# Patient Record
Sex: FEMALE | Race: WHITE | NOT HISPANIC OR LATINO | Employment: OTHER | URBAN - METROPOLITAN AREA
[De-identification: names, ages, dates, MRNs, and addresses within clinical notes are randomized per-mention and may not be internally consistent; named-entity substitution may affect disease eponyms.]

---

## 2017-10-24 ENCOUNTER — HOSPITAL ENCOUNTER (OUTPATIENT)
Dept: FAMILY MEDICINE CLINIC | Facility: CLINIC | Age: 22
Setting detail: SPECIMEN
Discharge: HOME | End: 2017-10-24
Attending: FAMILY MEDICINE | Admitting: FAMILY MEDICINE

## 2018-06-20 ENCOUNTER — HOSPITAL ENCOUNTER (OUTPATIENT)
Dept: LAB | Facility: HOSPITAL | Age: 23
Discharge: HOME | End: 2018-06-20
Attending: INTERNAL MEDICINE | Admitting: INTERNAL MEDICINE

## 2018-06-20 LAB
BASOPHILS # BLD AUTO: 0.1 10*3/UL (ref 0–0.2)
BASOPHILS NFR BLD AUTO: 1 % (ref 0–2)
DIFFERENTIAL METHOD BLD: (no result)
EOSINOPHIL # BLD AUTO: 0.1 10*3/UL (ref 0–0.3)
EOSINOPHIL # BLD AUTO: 1 % (ref 0–3)
ERYTHROCYTE [DISTWIDTH] IN BLOOD BY AUTOMATED COUNT: 13.8 % (ref 11.5–14.5)
HCT VFR BLD AUTO: 37 % (ref 35–49)
HGB BLD-MCNC: (no result) G/DL (ref 12–15)
LYMPHOCYTES # BLD AUTO: 2.1 10*3/UL (ref 0.8–4.8)
LYMPHOCYTES NFR BLD AUTO: 22 % (ref 18–42)
MCH RBC QN AUTO: 29.5 PG (ref 26–32)
MCHC RBC AUTO-ENTMCNC: 33.3 G/DL (ref 32–36)
MCV RBC AUTO: 88.5 FL (ref 80–94)
MONOCYTES # BLD AUTO: 0.4 10*3/UL (ref 0.1–1.3)
MONOCYTES NFR BLD AUTO: 4 % (ref 2–11)
NEUTROPHILS # BLD AUTO: 6.7 10*3/UL (ref 2.3–8.6)
NEUTROPHILS NFR BLD AUTO: 72 % (ref 50–75)
NRBC BLD AUTO-RTO: 0 /100{WBCS}
NRBC/RBC NFR BLD MANUAL: 0 10*3/UL
PLATELET # BLD AUTO: (no result) 10*3/UL (ref 150–450)
PMV BLD AUTO: 8.8 FL (ref 7.4–10.4)
RBC # BLD AUTO: 4.18 10*6/UL (ref 4–5.4)
WBC # BLD AUTO: 9.2 10*3/UL (ref 4.5–11.5)

## 2019-07-09 ENCOUNTER — TELEPHONE (OUTPATIENT)
Dept: FAMILY MEDICINE CLINIC | Facility: CLINIC | Age: 24
End: 2019-07-09

## 2019-07-09 RX ORDER — DEXTROAMPHETAMINE SACCHARATE, AMPHETAMINE ASPARTATE MONOHYDRATE, DEXTROAMPHETAMINE SULFATE AND AMPHETAMINE SULFATE 7.5; 7.5; 7.5; 7.5 MG/1; MG/1; MG/1; MG/1
CAPSULE, EXTENDED RELEASE ORAL
COMMUNITY
Start: 2018-07-24 | End: 2019-07-09 | Stop reason: SDUPTHER

## 2019-07-09 RX ORDER — DEXTROAMPHETAMINE SACCHARATE, AMPHETAMINE ASPARTATE MONOHYDRATE, DEXTROAMPHETAMINE SULFATE AND AMPHETAMINE SULFATE 7.5; 7.5; 7.5; 7.5 MG/1; MG/1; MG/1; MG/1
30 CAPSULE, EXTENDED RELEASE ORAL EVERY MORNING
Qty: 30 CAPSULE | Refills: 0 | Status: SHIPPED | OUTPATIENT
Start: 2019-07-09 | End: 2019-08-05 | Stop reason: SDUPTHER

## 2019-08-05 RX ORDER — DEXTROAMPHETAMINE SACCHARATE, AMPHETAMINE ASPARTATE MONOHYDRATE, DEXTROAMPHETAMINE SULFATE AND AMPHETAMINE SULFATE 7.5; 7.5; 7.5; 7.5 MG/1; MG/1; MG/1; MG/1
30 CAPSULE, EXTENDED RELEASE ORAL EVERY MORNING
Qty: 30 CAPSULE | Refills: 0 | Status: SHIPPED | OUTPATIENT
Start: 2019-08-05 | End: 2019-08-29 | Stop reason: SDUPTHER

## 2019-08-18 ENCOUNTER — TELEPHONE (OUTPATIENT)
Dept: FAMILY MEDICINE CLINIC | Facility: CLINIC | Age: 24
End: 2019-08-18

## 2019-08-18 ENCOUNTER — DOCUMENTATION (OUTPATIENT)
Dept: FAMILY MEDICINE CLINIC | Facility: CLINIC | Age: 24
End: 2019-08-18

## 2019-08-18 RX ORDER — PROMETHAZINE HYDROCHLORIDE 25 MG/1
25 TABLET ORAL EVERY 8 HOURS PRN
Qty: 20 TABLET | Refills: 0 | Status: SHIPPED | OUTPATIENT
Start: 2019-08-18 | End: 2020-02-14

## 2019-08-18 NOTE — TELEPHONE ENCOUNTER
Patient's mother called today reporting that shall be broke her right arm 2 days ago.  She was seen at urgent care in [place].  She was not given any brace or even a sling.  She was given prescription for hydrocodone 5 mg.  She reports that her pain is not well controlled and she has hard time to cope with all of that.  She is on her way home to know all the need to be with mom so she can help with her 1-year-old baby.  Mother is requesting something to relax her like Ativan.  I advised patient to take Aleve twice a day plus the pain medicine which was given to her by urgent care. I did not give her prescription for Ativan.  She was advised to go to urgent care again today if needs to be evaluated today.  Otherwise she needs to call her also office tomorrow to be seen.  She was also advised to call her primary care if needs to be seen.  Mother requested prescription for something for nausea and I send in prescription for Phenergan.  All questions were answered to her satisfaction.

## 2019-08-20 ENCOUNTER — OFFICE VISIT (OUTPATIENT)
Dept: FAMILY MEDICINE CLINIC | Facility: CLINIC | Age: 24
End: 2019-08-20

## 2019-08-20 VITALS
WEIGHT: 179 LBS | HEART RATE: 128 BPM | DIASTOLIC BLOOD PRESSURE: 94 MMHG | SYSTOLIC BLOOD PRESSURE: 141 MMHG | BODY MASS INDEX: 28.09 KG/M2 | OXYGEN SATURATION: 98 % | TEMPERATURE: 97.7 F | HEIGHT: 67 IN

## 2019-08-20 DIAGNOSIS — S52.501D CLOSED FRACTURE OF DISTAL END OF RIGHT RADIUS WITH ROUTINE HEALING, UNSPECIFIED FRACTURE MORPHOLOGY, SUBSEQUENT ENCOUNTER: ICD-10-CM

## 2019-08-20 DIAGNOSIS — F41.9 ANXIETY: Primary | ICD-10-CM

## 2019-08-20 PROBLEM — J45.909 ASTHMA: Status: ACTIVE | Noted: 2019-08-20

## 2019-08-20 PROCEDURE — 99213 OFFICE O/P EST LOW 20 MIN: CPT | Performed by: FAMILY MEDICINE

## 2019-08-20 RX ORDER — DULOXETIN HYDROCHLORIDE 30 MG/1
30 CAPSULE, DELAYED RELEASE ORAL DAILY
Qty: 30 CAPSULE | Refills: 1 | Status: SHIPPED | OUTPATIENT
Start: 2019-08-20 | End: 2019-09-17

## 2019-08-20 RX ORDER — HYDROCODONE BITARTRATE AND ACETAMINOPHEN 5; 325 MG/1; MG/1
TABLET ORAL
Refills: 0 | COMMUNITY
Start: 2019-08-17 | End: 2020-02-14

## 2019-08-20 RX ORDER — CLINDAMYCIN PHOSPHATE 11.9 MG/ML
SOLUTION TOPICAL 2 TIMES DAILY
Qty: 60 ML | Refills: 5 | Status: SHIPPED | OUTPATIENT
Start: 2019-08-20 | End: 2021-02-01 | Stop reason: SDUPTHER

## 2019-08-20 NOTE — PROGRESS NOTES
Subjective   Chief Complaint   Patient presents with   • Anxiety     Arlette Mix is a 23 y.o. female.     Last week on 8/16/19 she fell while catching her daughter from falling off of a bed.  She caught the baby with her left hand and landed on her outstretched right hand.  She went to a local urgent care and was diagnosed with a fracture of radial head.  She followed up with [facility] yesterday.  No cast or splint was recommended.  Sling was given for a few days but she was instructed to keep moving. Follow up in 2 weeks.  Will start PT in a few weeks.  Increased stress since the injury.  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      Anxiety   Presents for initial visit. Onset was 6 to 12 months ago. The problem has been rapidly worsening. Symptoms include decreased concentration, depressed mood, excessive worry, insomnia and nervous/anxious behavior. Patient reports no chest pain, dizziness, palpitations, shortness of breath or suicidal ideas. Symptoms occur constantly. The severity of symptoms is causing significant distress. The symptoms are aggravated by family issues.     Risk factors include recent illness. Her past medical history is significant for depression.      Past Medical History:   Diagnosis Date   • Right radial head fracture 08/16/2019     No past surgical history on file.  No Known Allergies  Social History     Socioeconomic History   • Marital status: Single     Spouse name: Not on file   • Number of children: Not on file   • Years of education: Not on file   • Highest education level: Not on file   Tobacco Use   • Smoking status: Never Smoker   • Smokeless tobacco: Never Used   Substance and Sexual Activity   • Alcohol use: Yes     Comment: caffeine 1c qd     Social History     Tobacco Use   Smoking Status Never Smoker   Smokeless Tobacco Never Used       family history is not on file.  Current Outpatient Medications on File Prior to Visit   Medication Sig  "Dispense Refill   • HYDROcodone-acetaminophen (NORCO) 5-325 MG per tablet   0   • amphetamine-dextroamphetamine XR (ADDERALL XR) 30 MG 24 hr capsule Take 1 capsule by mouth Every Morning 30 capsule 0   • promethazine (PHENERGAN) 25 MG tablet Take 1 tablet by mouth Every 8 (Eight) Hours As Needed for Nausea or Vomiting. 20 tablet 0     No current facility-administered medications on file prior to visit.      There is no problem list on file for this patient.      The following portions of the patient's history were reviewed and updated as appropriate: allergies, current medications, past family history, past medical history, past social history, past surgical history and problem list.    Review of Systems   Constitutional: Negative for chills and fever.   HENT: Negative for sinus pressure and sore throat.    Eyes: Negative for blurred vision.   Respiratory: Negative for cough and shortness of breath.    Cardiovascular: Negative for chest pain and palpitations.   Gastrointestinal: Negative for abdominal pain.   Endocrine: Negative for polyuria.   Musculoskeletal: Positive for myalgias.   Skin: Negative for rash.   Neurological: Negative for dizziness and headache.   Hematological: Negative for adenopathy.   Psychiatric/Behavioral: Positive for decreased concentration and depressed mood. Negative for suicidal ideas. The patient is nervous/anxious and has insomnia.        Objective   /94 (BP Location: Right arm, Patient Position: Sitting, Cuff Size: Adult)   Pulse (!) 128   Temp 97.7 °F (36.5 °C) (Oral)   Ht 168.9 cm (66.5\")   Wt 81.2 kg (179 lb)   SpO2 98%   BMI 28.46 kg/m²   Physical Exam   Constitutional: She is oriented to person, place, and time. She appears well-developed. No distress.   HENT:   Head: Normocephalic.   Eyes: Conjunctivae and lids are normal.   Neck: Trachea normal. No thyroid mass and no thyromegaly present.   Cardiovascular: Normal rate, regular rhythm and normal heart sounds. "   Pulmonary/Chest: Effort normal and breath sounds normal.   Musculoskeletal:   Right wrist mildly swollen   Lymphadenopathy:     She has no cervical adenopathy.   Neurological: She is alert and oriented to person, place, and time.   Skin: Skin is warm and dry.   Psychiatric: Her speech is normal and behavior is normal. Her mood appears anxious. She is attentive.       No visits with results within 1 Week(s) from this visit.   Latest known visit with results is:   Hospital Outpatient Visit on 06/20/2018   Component Date Value Ref Range Status   • WBC 06/20/2018 9.2  4.5 - 11.5 10*3/uL Final   • RBC 06/20/2018 4.18  4.00 - 5.40 10*6/uL Final   • Hemoglobin 06/20/2018 12.3 RESULT CHECKED  12.0 - 15.0 g/dL Final   • Hematocrit 06/20/2018 37.0  35 - 49 % Final   • MCV 06/20/2018 88.5  80 - 94 fL Final   • MCH 06/20/2018 29.5  26 - 32 pg Final   • MCHC 06/20/2018 33.3  32 - 36 g/dL Final   • RDW 06/20/2018 13.8  11.5 - 14.5 % Final   • Platelets 06/20/2018 368 RESULT CHECKED  150 - 450 10*3/uL Final   • MPV 06/20/2018 8.8  7.4 - 10.4 fL Final   • Differential Type 06/20/2018 AUTO   Final   • Neutrophils Absolute 06/20/2018 6.7  2.3 - 8.6 10*3/uL Final   • Lymphocytes Absolute 06/20/2018 2.1  0.8 - 4.8 10*3/uL Final   • Monocytes Absolute 06/20/2018 0.4  0.1 - 1.3 10*3/uL Final   • Eosinophils Absolute 06/20/2018 0.1  0.0 - 0.3 10*3/uL Final   • Basophils Absolute 06/20/2018 0.1  0 - 0.2 10*3/uL Final   • Neutrophil Rel % 06/20/2018 72  50 - 75 % Final   • Lymphocyte Rel % 06/20/2018 22  18 - 42 % Final   • Monocyte Rel % 06/20/2018 4  2 - 11 % Final   • Eosinophil Rel % 06/20/2018 1  0 - 3 % Final   • Basophil Rel % 06/20/2018 1  0 - 2 % Final   • nRBC 06/20/2018 0  0 /100[WBCs] Final   • Absolute nRBC 06/20/2018 0  10*3/uL Final           Assessment/Plan   Problems Addressed this Visit        Musculoskeletal and Integument    Closed fracture of lower end of right radius with routine healing     Follow up with [facility]  [facility] as planned            Other    Anxiety - Primary          There are no diagnoses linked to this encounter.

## 2019-08-29 RX ORDER — DEXTROAMPHETAMINE SACCHARATE, AMPHETAMINE ASPARTATE MONOHYDRATE, DEXTROAMPHETAMINE SULFATE AND AMPHETAMINE SULFATE 7.5; 7.5; 7.5; 7.5 MG/1; MG/1; MG/1; MG/1
30 CAPSULE, EXTENDED RELEASE ORAL EVERY MORNING
Qty: 30 CAPSULE | Refills: 0 | Status: SHIPPED | OUTPATIENT
Start: 2019-08-29 | End: 2019-10-01 | Stop reason: SDUPTHER

## 2019-09-16 ENCOUNTER — TELEPHONE (OUTPATIENT)
Dept: FAMILY MEDICINE CLINIC | Facility: CLINIC | Age: 24
End: 2019-09-16

## 2019-09-17 RX ORDER — CITALOPRAM 20 MG/1
20 TABLET ORAL DAILY
Qty: 90 TABLET | Refills: 1 | Status: SHIPPED | OUTPATIENT
Start: 2019-09-17 | End: 2020-04-13 | Stop reason: SDUPTHER

## 2019-10-01 RX ORDER — DEXTROAMPHETAMINE SACCHARATE, AMPHETAMINE ASPARTATE MONOHYDRATE, DEXTROAMPHETAMINE SULFATE AND AMPHETAMINE SULFATE 7.5; 7.5; 7.5; 7.5 MG/1; MG/1; MG/1; MG/1
30 CAPSULE, EXTENDED RELEASE ORAL EVERY MORNING
Qty: 30 CAPSULE | Refills: 0 | Status: SHIPPED | OUTPATIENT
Start: 2019-10-01 | End: 2019-10-28 | Stop reason: SDUPTHER

## 2019-10-28 RX ORDER — DEXTROAMPHETAMINE SACCHARATE, AMPHETAMINE ASPARTATE MONOHYDRATE, DEXTROAMPHETAMINE SULFATE AND AMPHETAMINE SULFATE 7.5; 7.5; 7.5; 7.5 MG/1; MG/1; MG/1; MG/1
30 CAPSULE, EXTENDED RELEASE ORAL EVERY MORNING
Qty: 30 CAPSULE | Refills: 0 | Status: SHIPPED | OUTPATIENT
Start: 2019-10-28 | End: 2019-11-20 | Stop reason: SDUPTHER

## 2019-11-20 RX ORDER — DEXTROAMPHETAMINE SACCHARATE, AMPHETAMINE ASPARTATE MONOHYDRATE, DEXTROAMPHETAMINE SULFATE AND AMPHETAMINE SULFATE 7.5; 7.5; 7.5; 7.5 MG/1; MG/1; MG/1; MG/1
30 CAPSULE, EXTENDED RELEASE ORAL EVERY MORNING
Qty: 30 CAPSULE | Refills: 0 | Status: SHIPPED | OUTPATIENT
Start: 2019-11-20 | End: 2019-12-18 | Stop reason: SDUPTHER

## 2019-12-18 ENCOUNTER — TELEPHONE (OUTPATIENT)
Dept: FAMILY MEDICINE CLINIC | Facility: CLINIC | Age: 24
End: 2019-12-18

## 2019-12-18 DIAGNOSIS — F90.0 ATTENTION DEFICIT HYPERACTIVITY DISORDER (ADHD), PREDOMINANTLY INATTENTIVE TYPE: Primary | ICD-10-CM

## 2019-12-18 RX ORDER — DEXTROAMPHETAMINE SACCHARATE, AMPHETAMINE ASPARTATE MONOHYDRATE, DEXTROAMPHETAMINE SULFATE AND AMPHETAMINE SULFATE 7.5; 7.5; 7.5; 7.5 MG/1; MG/1; MG/1; MG/1
30 CAPSULE, EXTENDED RELEASE ORAL EVERY MORNING
Qty: 30 CAPSULE | Refills: 0 | Status: SHIPPED | OUTPATIENT
Start: 2019-12-18 | End: 2020-01-21 | Stop reason: SDUPTHER

## 2019-12-18 NOTE — TELEPHONE ENCOUNTER
REFILL OF ADDERALL.  PHARMACY IS [facility]. CORNER OF [facility] [address]

## 2020-01-20 ENCOUNTER — TELEPHONE (OUTPATIENT)
Dept: FAMILY MEDICINE CLINIC | Facility: CLINIC | Age: 25
End: 2020-01-20

## 2020-01-20 DIAGNOSIS — F90.0 ATTENTION DEFICIT HYPERACTIVITY DISORDER (ADHD), PREDOMINANTLY INATTENTIVE TYPE: ICD-10-CM

## 2020-01-20 NOTE — TELEPHONE ENCOUNTER
ADDERALL REFILL. [facility] AND [facility]

## 2020-01-21 RX ORDER — DEXTROAMPHETAMINE SACCHARATE, AMPHETAMINE ASPARTATE MONOHYDRATE, DEXTROAMPHETAMINE SULFATE AND AMPHETAMINE SULFATE 7.5; 7.5; 7.5; 7.5 MG/1; MG/1; MG/1; MG/1
30 CAPSULE, EXTENDED RELEASE ORAL EVERY MORNING
Qty: 30 CAPSULE | Refills: 0 | Status: SHIPPED | OUTPATIENT
Start: 2020-01-21 | End: 2020-02-18 | Stop reason: SDUPTHER

## 2020-02-14 ENCOUNTER — OFFICE VISIT (OUTPATIENT)
Dept: FAMILY MEDICINE CLINIC | Facility: CLINIC | Age: 25
End: 2020-02-14

## 2020-02-14 VITALS
OXYGEN SATURATION: 98 % | SYSTOLIC BLOOD PRESSURE: 151 MMHG | HEART RATE: 95 BPM | WEIGHT: 184 LBS | DIASTOLIC BLOOD PRESSURE: 94 MMHG | BODY MASS INDEX: 29.25 KG/M2 | TEMPERATURE: 97.2 F

## 2020-02-14 DIAGNOSIS — L72.3 SEBACEOUS CYST: Primary | ICD-10-CM

## 2020-02-14 DIAGNOSIS — L74.510 AXILLARY HYPERHIDROSIS: ICD-10-CM

## 2020-02-14 DIAGNOSIS — L85.8 KERATOSIS PILARIS: ICD-10-CM

## 2020-02-14 PROCEDURE — 99214 OFFICE O/P EST MOD 30 MIN: CPT | Performed by: FAMILY MEDICINE

## 2020-02-14 RX ORDER — TOPIRAMATE 25 MG/1
25 TABLET ORAL DAILY
Qty: 90 TABLET | Refills: 1 | Status: SHIPPED | OUTPATIENT
Start: 2020-02-14 | End: 2021-02-12

## 2020-02-14 NOTE — ASSESSMENT & PLAN NOTE
Very small.  No need for treatment at this time.  Pt advised to call back if spot enlarges or if any sign of infection.

## 2020-02-14 NOTE — PATIENT INSTRUCTIONS
Hyperhidrosis  Hyperhidrosis is a condition in which the body sweats a lot more than normal (excessively). Sweating is a necessary function for a human body. It is normal to sweat when you are hot, physically active, or anxious. However, hyperhidrosis is sweating to an excessive degree. Although the condition is not a serious one, it can make you feel embarrassed.  There are two kinds of hyperhidrosis:  · Primary hyperhidrosis. The sweating usually localizes in one part of your body, such as your underarms, or in a few areas, such as your feet, face, underarms, and hands. This is the more common kind of hyperhidrosis.  · Secondary hyperhidrosis. This type usually affects your entire body.  What are the causes?  The cause of this condition depends on the kind of hyperhidrosis that you have.  · Primary hyperhidrosis may be caused by sweat glands that are more active than normal.  · Secondary hyperhidrosis may be caused by an underlying condition or by taking certain medicines, such as antidepressants or diabetes medicines. Possible conditions that may cause secondary hyperhidrosis include:  ? Diabetes.  ? Gout.  ? Anxiety.  ? Obesity.  ? Menopause.  ? Overactive thyroid (hyperthyroidism).  ? Tumors.  ? Frostbite.  ? Certain types of cancers.  ? Alcoholism.  ? Injury to your nervous system.  ? Stroke.  ? Parkinson's disease.  What increases the risk?  You are more likely to develop primary hyperhidrosis if you have a family history of the condition.  What are the signs or symptoms?  Symptoms of this condition include:  · Feeling like you are sweating constantly, even while you are not being active.  · Having skin that peels or gets paler or softer in the areas where you sweat the most.  · Being able to see sweat on your skin.  Other symptoms depend on the kind of hyperhidrosis that you have.  · Symptoms of primary hyperhidrosis may include:  ? Sweating in the same location on both sides of your body.  ? Sweating only  during the day and not while you are sleeping.  ? Sweating in specific areas, such as your underarms, palms, feet, and face.  · Symptoms of secondary hyperhidrosis may include:  ? Sweating all over your body.  ? Sweating even while you sleep.  How is this diagnosed?  This condition may be diagnosed by:  · Medical history.  · Physical exam.  You may also have other tests, including:  · Tests to measure the amount of sweat you produce and to show the areas where you sweat the most. These tests may involve:  ? Using color-changing chemicals to show patterns of sweating on the skin.  ? Weighing paper that has been applied to the skin. This will show the amount of sweat that your body produces.  ? Measuring the amount of water that evaporates from the skin.  ? Using infrared technology to show patterns of sweating on the skin.  · Tests to check for other conditions that may be causing excess sweating. This may include blood, urine, or imaging tests.  How is this treated?  Treatment for this condition depends on the kind of hyperhidrosis that you have and the areas of your body that are affected. Your health care provider will also treat any underlying conditions.  Treatment may include:  · Medicines, such as:   ? Antiperspirants. These are medicines that stop sweat.  ? Injectable medicines. These may include small injections of botulinum toxin.  ? Oral medicines. These are taken by mouth to treat underlying conditions and other symptoms.  · A procedure to:  ? Temporarily turn off the sweat glands in your hands and feet (iontophoresis).  ? Remove your sweat glands.  ? Cut or destroy the nerves so that they do not send a signal to the sweat glands (sympathectomy).  Follow these instructions at home:  Lifestyle    · Limit or avoid foods or beverages that may increase your risk of sweating, such as:  ? Spicy food.  ? Caffeine.  ? Alcohol.  ? Foods that contain monosodium glutamate (MSG).  · If your feet sweat:  ? Wear  sandals when possible.  ? Do not wear cotton socks. Wear socks that remove or wick moisture from your feet.  ? Wear leather shoes.  ? Avoid wearing the same pair of shoes for two days in a row.  · Try placing sweat pads under your clothes to prevent underarm sweat from showing.  · Keep a journal of your sweat symptoms and when they occur. This may help you identify things that trigger your sweating.  General instructions  · Take over-the-counter and prescription medicines only as told by your health care provider.  · Use antiperspirants as told by your health care provider.  · Consider joining a hyperhidrosis support group.  · Keep all follow-up visits as told by your health care provider. This is important.  Contact a health care provider if:  · You have new symptoms.  · Your symptoms get worse.  Summary  · Hyperhidrosis is a condition in which the body sweats a lot more than normal (excessively).  · With primary hyperhidrosis, the sweating usually localizes in one part of your body, such as your underarms, or in a few areas, such as your feet, face, underarms, and hands. It is caused by overactive sweat glands in the affected area.  · With secondary hyperhidrosis, the sweating affects your entire body. This is caused by an underlying condition.  · Treatment for this condition depends on the kind of hyperhidrosis that you have and the parts of your body that are affected.  This information is not intended to replace advice given to you by your health care provider. Make sure you discuss any questions you have with your health care provider.  Document Released: 02/16/2007 Document Revised: 12/21/2018 Document Reviewed: 12/21/2018  [employer] Interactive Patient Education © 2019 [employer]

## 2020-02-14 NOTE — PROGRESS NOTES
Subjective   Chief Complaint   Patient presents with   • Leg Problem     lump on back of rt leg     Arlette Mix is a 24 y.o. female.     She reports a small bump on the back of her left upper leg.  No redness or blistering or drainage just a subcutaneous bump about 5 mm.    She also has a history of hyperhidrosis that she uses topical clinamycin for.  She says this helps some but I am not sure why.  She is interested in Botox for sweat reduction but will have to check with her health insurance to see if they will cover it.  I also mentioned Qbrexza for treatment but she is not interested at this time.      She also complains of rash on her thighs and upper arms that is characterized by multiple small firm bumps without redness or drainage.     Rash   This is a new problem. The current episode started 1 to 4 weeks ago. The problem is unchanged. The rash is diffuse. The rash is characterized by dryness and itchiness. She was exposed to nothing. Pertinent negatives include no cough, fatigue, fever, rhinorrhea, shortness of breath or sore throat.      Past Medical History:   Diagnosis Date   • Right radial head fracture 08/16/2019     No past surgical history on file.  No Known Allergies  Social History     Socioeconomic History   • Marital status: Single     Spouse name: Not on file   • Number of children: Not on file   • Years of education: Not on file   • Highest education level: Not on file   Tobacco Use   • Smoking status: Never Smoker   • Smokeless tobacco: Never Used   Substance and Sexual Activity   • Alcohol use: Yes     Comment: caffeine 1c qd     Social History     Tobacco Use   Smoking Status Never Smoker   Smokeless Tobacco Never Used       family history is not on file.  Current Outpatient Medications on File Prior to Visit   Medication Sig Dispense Refill   • [DISCONTINUED] norelgestromin-ethinyl estradiol (XULANE) 150-35 MCG/24HR Apply 1 patch topically to the appropriate area as directed.     •  amphetamine-dextroamphetamine XR (ADDERALL XR) 30 MG 24 hr capsule Take 1 capsule by mouth Every Morning 30 capsule 0   • citalopram (CELEXA) 20 MG tablet Take 1 tablet by mouth Daily. 90 tablet 1   • clindamycin (CLEOCIN-T) 1 % external solution Apply  topically to the appropriate area as directed 2 (Two) Times a Day. 60 mL 5   • [DISCONTINUED] HYDROcodone-acetaminophen (NORCO) 5-325 MG per tablet   0   • [DISCONTINUED] norelgestromin-ethinyl estradiol (ORTHO EVRA) 150-35 MCG/24HR Place 1 patch on the skin as directed by provider 1 (One) Time Per Week. 12 patch 3   • [DISCONTINUED] promethazine (PHENERGAN) 25 MG tablet Take 1 tablet by mouth Every 8 (Eight) Hours As Needed for Nausea or Vomiting. 20 tablet 0     No current facility-administered medications on file prior to visit.      Patient Active Problem List   Diagnosis   • Asthma   • Attention-deficit hyperactivity disorder   • Depression   • Encounter for general adult medical examination without abnormal findings   • Anxiety   • Closed fracture of lower end of right radius with routine healing   • Axillary hyperhidrosis   • Sebaceous cyst   • Keratosis pilaris       The following portions of the patient's history were reviewed and updated as appropriate: allergies, current medications, past family history, past medical history, past social history, past surgical history and problem list.    Review of Systems   Constitutional: Negative for chills, fatigue and fever.   HENT: Negative for rhinorrhea, sinus pressure and sore throat.    Eyes: Negative for blurred vision.   Respiratory: Negative for cough and shortness of breath.    Cardiovascular: Negative for chest pain and palpitations.   Gastrointestinal: Negative for abdominal pain.   Endocrine: Negative for polyuria.   Skin: Positive for dry skin and rash.   Neurological: Negative for dizziness and headache.   Hematological: Negative for adenopathy.   Psychiatric/Behavioral: Negative for depressed mood.        Objective   /94 (BP Location: Right arm, Patient Position: Sitting, Cuff Size: Adult)   Pulse 95   Temp 97.2 °F (36.2 °C) (Tympanic)   Wt 83.5 kg (184 lb)   SpO2 98%   BMI 29.25 kg/m²   Physical Exam   Constitutional: She is oriented to person, place, and time. She appears well-developed. No distress.   HENT:   Head: Normocephalic.   Eyes: Conjunctivae and lids are normal.   Neck: Trachea normal. No thyroid mass and no thyromegaly present.   Cardiovascular: Normal rate, regular rhythm and normal heart sounds.   Pulmonary/Chest: Effort normal and breath sounds normal.   Lymphadenopathy:     She has no cervical adenopathy.   Neurological: She is alert and oriented to person, place, and time.   Skin: Skin is warm and dry.   Small, <5mm, subcutaneous cystic lesion on left upper posterior thigh consistent with sebaceous cyst.  Also with firm papules on thighs and upper arms consistent with keratosis pilaris.    Psychiatric: She has a normal mood and affect. Her speech is normal and behavior is normal. She is attentive.       No visits with results within 1 Week(s) from this visit.   Latest known visit with results is:   No results found for any previous visit.           Assessment/Plan   Problems Addressed this Visit        Musculoskeletal and Integument    Axillary hyperhidrosis     Stable.  Continue current management.          Sebaceous cyst - Primary     Very small.  No need for treatment at this time.  Pt advised to call back if spot enlarges or if any sign of infection.          Keratosis pilaris     Keep areas clean and use mild abrasive such as loofah or exfoliating scrub.

## 2020-02-18 ENCOUNTER — TELEPHONE (OUTPATIENT)
Dept: FAMILY MEDICINE CLINIC | Facility: CLINIC | Age: 25
End: 2020-02-18

## 2020-02-18 DIAGNOSIS — F90.0 ATTENTION DEFICIT HYPERACTIVITY DISORDER (ADHD), PREDOMINANTLY INATTENTIVE TYPE: ICD-10-CM

## 2020-02-18 RX ORDER — DEXTROAMPHETAMINE SACCHARATE, AMPHETAMINE ASPARTATE MONOHYDRATE, DEXTROAMPHETAMINE SULFATE AND AMPHETAMINE SULFATE 7.5; 7.5; 7.5; 7.5 MG/1; MG/1; MG/1; MG/1
30 CAPSULE, EXTENDED RELEASE ORAL EVERY MORNING
Qty: 30 CAPSULE | Refills: 0 | Status: SHIPPED | OUTPATIENT
Start: 2020-02-18 | End: 2020-03-20 | Stop reason: SDUPTHER

## 2020-02-18 NOTE — TELEPHONE ENCOUNTER
----- Message from Carol Correia sent at 2/18/2020  9:27 AM EST -----  Regarding: REFILL  amphetamine-dextroamphetamine XR (ADDERALL XR) 30 MG 24 hr capsule    [id number] [place], [place] - [address] AT [address] OF [place] [address] [address] - [phone]  - [phone] (Phone)  [phone] (Fax)    THANK YOU

## 2020-02-27 ENCOUNTER — OFFICE VISIT (OUTPATIENT)
Dept: FAMILY MEDICINE CLINIC | Facility: CLINIC | Age: 25
End: 2020-02-27

## 2020-02-27 VITALS
DIASTOLIC BLOOD PRESSURE: 90 MMHG | BODY MASS INDEX: 28.72 KG/M2 | SYSTOLIC BLOOD PRESSURE: 130 MMHG | HEIGHT: 67 IN | WEIGHT: 183 LBS | TEMPERATURE: 98.8 F | OXYGEN SATURATION: 97 % | HEART RATE: 130 BPM | RESPIRATION RATE: 16 BRPM

## 2020-02-27 DIAGNOSIS — J06.9 ACUTE URI: Primary | ICD-10-CM

## 2020-02-27 LAB
EXPIRATION DATE: NORMAL
INTERNAL CONTROL: NORMAL
Lab: NORMAL
S PYO AG THROAT QL: NEGATIVE

## 2020-02-27 PROCEDURE — 99213 OFFICE O/P EST LOW 20 MIN: CPT | Performed by: FAMILY MEDICINE

## 2020-02-27 PROCEDURE — 87880 STREP A ASSAY W/OPTIC: CPT | Performed by: FAMILY MEDICINE

## 2020-02-27 NOTE — PROGRESS NOTES
Subjective   Chief Complaint   Patient presents with   • Sore Throat   • Nausea     Arlette Mix is a 24 y.o. female.     Patient Care Team:  Rosie Manuel MD as PCP - General    She is coming in today due to upper respiratory symptoms.  She reports that her symptoms started about 3 days ago with some congestion and sore throat.  She also has some cough.  She denies any fever or body aches.  She is concerned she might have a strep as she noted some white spots in the back of her throat.  She denies any vomiting or diarrhea but she had some nausea.  She is able to eat and drink just fine.  No rashes reported.       The following portions of the patient's history were reviewed and updated as appropriate: allergies, current medications, past family history, past medical history, past social history, past surgical history and problem list.  Past Medical History:   Diagnosis Date   • Anxiety    • Right radial head fracture 2019     Past Surgical History:   Procedure Laterality Date   •  SECTION     • CHOLECYSTECTOMY       The patient has a family history of  Family History   Problem Relation Age of Onset   • Migraines Mother      Social History     Socioeconomic History   • Marital status: Single     Spouse name: Not on file   • Number of children: Not on file   • Years of education: Not on file   • Highest education level: Not on file   Tobacco Use   • Smoking status: Never Smoker   • Smokeless tobacco: Never Used   Substance and Sexual Activity   • Alcohol use: Yes     Comment: caffeine 1c qd   • Drug use: Never   • Sexual activity: Defer       Review of Systems   Constitutional: Negative for chills, fatigue and fever.   HENT: Positive for congestion and sore throat. Negative for ear pain, postnasal drip, rhinorrhea and swollen glands.    Respiratory: Positive for cough. Negative for stridor.    Gastrointestinal: Positive for nausea. Negative for abdominal pain, diarrhea and vomiting.   Skin:  "Negative for rash and skin lesions.     Visit Vitals  /90 (BP Location: Left arm, Patient Position: Sitting, Cuff Size: Large Adult)   Pulse (!) 130   Temp 98.8 °F (37.1 °C) (Oral)   Resp 16   Ht 168.9 cm (66.5\")   Wt 83 kg (183 lb)   SpO2 97%   BMI 29.09 kg/m²       Current Outpatient Medications:   •  amphetamine-dextroamphetamine XR (ADDERALL XR) 30 MG 24 hr capsule, Take 1 capsule by mouth Every Morning, Disp: 30 capsule, Rfl: 0  •  citalopram (CELEXA) 20 MG tablet, Take 1 tablet by mouth Daily., Disp: 90 tablet, Rfl: 1  •  clindamycin (CLEOCIN-T) 1 % external solution, Apply  topically to the appropriate area as directed 2 (Two) Times a Day., Disp: 60 mL, Rfl: 5  •  norelgestromin-ethinyl estradiol (XULANE) 150-35 MCG/24HR, Place 1 patch on the skin as directed by provider 1 (One) Time Per Week., Disp: 9 patch, Rfl: 3  •  topiramate (TOPAMAX) 25 MG tablet, Take 1 tablet by mouth Daily., Disp: 90 tablet, Rfl: 1    Objective   Physical Exam   Constitutional: She appears well-developed and well-nourished.   HENT:   Head: Normocephalic and atraumatic.   Right Ear: External ear normal.   Left Ear: External ear normal.   Mouth/Throat: Oropharynx is clear and moist.   Some inflammation in oropharyngeal area noted.  Some postnasal drainage.   Eyes: Pupils are equal, round, and reactive to light. Conjunctivae and EOM are normal.   Neck: Normal range of motion. Neck supple.   Cardiovascular: Normal rate, regular rhythm, normal heart sounds and intact distal pulses.   Pulmonary/Chest: Effort normal and breath sounds normal.   Neurological: She is alert.   Skin: Skin is warm.   Nursing note and vitals reviewed.           Office Visit on 02/27/2020   Component Date Value Ref Range Status   • Rapid Strep A Screen 02/27/2020 Negative  Negative, VALID, INVALID, Not Performed Final   • Internal Control 02/27/2020 Passed  Passed Final   • Lot Number 02/27/2020 [id number]   Final   • Expiration Date 02/27/2020 04/30/2021   " Final                 Assessment/Plan   Problems Addressed this Visit        Respiratory    Acute URI - Primary    Relevant Orders    POC Rapid Strep A (Completed)        Strep test was done today and was negative.  Her symptoms are consistent with upper respiratory infection.  This was discussed with the patient and symptomatic treatment was recommended.  She was advised to monitor her symptoms and call us back if not getting better.             Requested Prescriptions      No prescriptions requested or ordered in this encounter

## 2020-03-20 ENCOUNTER — TELEPHONE (OUTPATIENT)
Dept: FAMILY MEDICINE CLINIC | Facility: CLINIC | Age: 25
End: 2020-03-20

## 2020-03-20 DIAGNOSIS — F90.0 ATTENTION DEFICIT HYPERACTIVITY DISORDER (ADHD), PREDOMINANTLY INATTENTIVE TYPE: ICD-10-CM

## 2020-03-20 RX ORDER — DEXTROAMPHETAMINE SACCHARATE, AMPHETAMINE ASPARTATE MONOHYDRATE, DEXTROAMPHETAMINE SULFATE AND AMPHETAMINE SULFATE 7.5; 7.5; 7.5; 7.5 MG/1; MG/1; MG/1; MG/1
30 CAPSULE, EXTENDED RELEASE ORAL EVERY MORNING
Qty: 30 CAPSULE | Refills: 0 | Status: SHIPPED | OUTPATIENT
Start: 2020-03-20 | End: 2020-04-21 | Stop reason: SDUPTHER

## 2020-03-20 NOTE — TELEPHONE ENCOUNTER
amphetamine-dextroamphetamine XR (ADDERALL XR) 30 MG 24 hr capsule      [facility] #[id number] [address] AT [facility] OF [facility] & [address] - [phone]  - [phone] (Phone)  [phone] (Fax)

## 2020-04-13 RX ORDER — CITALOPRAM 20 MG/1
20 TABLET ORAL DAILY
Qty: 90 TABLET | Refills: 0 | Status: SHIPPED | OUTPATIENT
Start: 2020-04-13 | End: 2021-02-12

## 2020-04-21 ENCOUNTER — OFFICE VISIT (OUTPATIENT)
Dept: FAMILY MEDICINE CLINIC | Facility: CLINIC | Age: 25
End: 2020-04-21

## 2020-04-21 DIAGNOSIS — Z79.899 HIGH RISK MEDICATION USE: Primary | ICD-10-CM

## 2020-04-21 DIAGNOSIS — F90.0 ATTENTION DEFICIT HYPERACTIVITY DISORDER (ADHD), PREDOMINANTLY INATTENTIVE TYPE: ICD-10-CM

## 2020-04-21 DIAGNOSIS — F41.9 ANXIETY: ICD-10-CM

## 2020-04-21 PROBLEM — J06.9 ACUTE URI: Status: RESOLVED | Noted: 2020-02-27 | Resolved: 2020-04-21

## 2020-04-21 PROCEDURE — 99213 OFFICE O/P EST LOW 20 MIN: CPT | Performed by: FAMILY MEDICINE

## 2020-04-21 RX ORDER — DEXTROAMPHETAMINE SACCHARATE, AMPHETAMINE ASPARTATE MONOHYDRATE, DEXTROAMPHETAMINE SULFATE AND AMPHETAMINE SULFATE 7.5; 7.5; 7.5; 7.5 MG/1; MG/1; MG/1; MG/1
30 CAPSULE, EXTENDED RELEASE ORAL EVERY MORNING
Qty: 30 CAPSULE | Refills: 0 | Status: SHIPPED | OUTPATIENT
Start: 2020-04-21 | End: 2020-05-15 | Stop reason: SDUPTHER

## 2020-04-21 NOTE — PROGRESS NOTES
Subjective   Chief Complaint   Patient presents with   • ADD   • Anxiety   • Med Refill     Arlette Mix is a 24 y.o. female.     Patient Care Team:  Rosie Manuel MD as PCP - General     You have chosen to receive care through a telephone visit. Do you consent to use a telephone visit for your medical care today? Yes      Patient is being evaluated today through telemedicine appointment in view of COVID-19 pandemic.  She is following up today on ADHD and anxiety.  Her medical doctor is not currently available to address this.  She is on Celexa and Adderall and she reports being on those medications for quite some time.  She denies any medication side effects and she feels that those medicines are controlling her symptoms just fine.  She has good appetite and she sleeps well.       The following portions of the patient's history were reviewed and updated as appropriate: allergies, current medications, past family history, past medical history, past social history, past surgical history and problem list.  Past Medical History:   Diagnosis Date   • Anxiety    • Right radial head fracture 2019     Past Surgical History:   Procedure Laterality Date   •  SECTION     • CHOLECYSTECTOMY       The patient has a family history of  Family History   Problem Relation Age of Onset   • Migraines Mother      Social History     Socioeconomic History   • Marital status: Single     Spouse name: Not on file   • Number of children: Not on file   • Years of education: Not on file   • Highest education level: Not on file   Tobacco Use   • Smoking status: Never Smoker   • Smokeless tobacco: Never Used   Substance and Sexual Activity   • Alcohol use: Yes     Comment: caffeine 1c qd   • Drug use: Never   • Sexual activity: Defer       Review of Systems   Constitutional: Negative for activity change, appetite change and fatigue.   Gastrointestinal: Negative for diarrhea, nausea and vomiting.   Skin: Negative for dry  skin, rash and skin lesions.   Psychiatric/Behavioral: Positive for decreased concentration. Negative for agitation, behavioral problems, dysphoric mood, hallucinations, self-injury, sleep disturbance, suicidal ideas, negative for hyperactivity, depressed mood and stress. The patient is not nervous/anxious.      There were no vitals taken for this visit.    Current Outpatient Medications:   •  amphetamine-dextroamphetamine XR (Adderall XR) 30 MG 24 hr capsule, Take 1 capsule by mouth Every Morning, Disp: 30 capsule, Rfl: 0  •  citalopram (CeleXA) 20 MG tablet, Take 1 tablet by mouth Daily., Disp: 90 tablet, Rfl: 0  •  clindamycin (CLEOCIN-T) 1 % external solution, Apply  topically to the appropriate area as directed 2 (Two) Times a Day., Disp: 60 mL, Rfl: 5  •  norelgestromin-ethinyl estradiol (XULANE) 150-35 MCG/24HR, Place 1 patch on the skin as directed by provider 1 (One) Time Per Week., Disp: 9 patch, Rfl: 3  •  topiramate (TOPAMAX) 25 MG tablet, Take 1 tablet by mouth Daily., Disp: 90 tablet, Rfl: 1    Objective   Physical Exam   Constitutional: She is oriented to person, place, and time. No distress.   Patient was evaluated on the phone today, she is pleasant and cooperative, she is in no distress, normal voice and speech.   Neurological: She is alert and oriented to person, place, and time.   Psychiatric: She has a normal mood and affect. Judgment and thought content normal.                    Assessment/Plan   Problems Addressed this Visit        Other    Attention-deficit hyperactivity disorder    Relevant Medications    amphetamine-dextroamphetamine XR (Adderall XR) 30 MG 24 hr capsule    Anxiety    High risk medication use - Primary        I reviewed her prescription history and she has been on Adderall for a long time.  Medication refill was given.  She is also on Celexa for anxiety which is controlling her symptoms well.  She will continue the same.    I spent 15 minutes evaluating this patient's  problems and working on this encounter.             Requested Prescriptions     Signed Prescriptions Disp Refills   • amphetamine-dextroamphetamine XR (Adderall XR) 30 MG 24 hr capsule 30 capsule 0     Sig: Take 1 capsule by mouth Every Morning

## 2020-05-15 ENCOUNTER — TELEPHONE (OUTPATIENT)
Dept: FAMILY MEDICINE CLINIC | Facility: CLINIC | Age: 25
End: 2020-05-15

## 2020-05-15 DIAGNOSIS — F90.0 ATTENTION DEFICIT HYPERACTIVITY DISORDER (ADHD), PREDOMINANTLY INATTENTIVE TYPE: ICD-10-CM

## 2020-05-15 RX ORDER — DEXTROAMPHETAMINE SACCHARATE, AMPHETAMINE ASPARTATE MONOHYDRATE, DEXTROAMPHETAMINE SULFATE AND AMPHETAMINE SULFATE 7.5; 7.5; 7.5; 7.5 MG/1; MG/1; MG/1; MG/1
30 CAPSULE, EXTENDED RELEASE ORAL EVERY MORNING
Qty: 30 CAPSULE | Refills: 0 | Status: SHIPPED | OUTPATIENT
Start: 2020-05-15 | End: 2020-06-17 | Stop reason: SDUPTHER

## 2020-06-17 ENCOUNTER — TELEPHONE (OUTPATIENT)
Dept: FAMILY MEDICINE CLINIC | Facility: CLINIC | Age: 25
End: 2020-06-17

## 2020-06-17 DIAGNOSIS — F90.0 ATTENTION DEFICIT HYPERACTIVITY DISORDER (ADHD), PREDOMINANTLY INATTENTIVE TYPE: ICD-10-CM

## 2020-06-17 RX ORDER — DEXTROAMPHETAMINE SACCHARATE, AMPHETAMINE ASPARTATE MONOHYDRATE, DEXTROAMPHETAMINE SULFATE AND AMPHETAMINE SULFATE 7.5; 7.5; 7.5; 7.5 MG/1; MG/1; MG/1; MG/1
30 CAPSULE, EXTENDED RELEASE ORAL EVERY MORNING
Qty: 30 CAPSULE | Refills: 0 | Status: SHIPPED | OUTPATIENT
Start: 2020-06-17 | End: 2020-07-15 | Stop reason: SDUPTHER

## 2020-06-17 NOTE — TELEPHONE ENCOUNTER
----- Message from Robert Green sent at 10/28/2019  7:46 AM CDT -----  Contact: pt   Type:  Needs Medical Advice    Who Called: DARY JOHNSON   Symptoms (please be specific): pink eye    How long has patient had these symptoms:  This morning   Pharmacy name and phone #:       Would the patient rather a call back or a response via [facility]? Call   Best Call Back Number:  [phone] (home)    Additional Information:    REFILL ADDERALL.  GERSONS

## 2020-07-15 ENCOUNTER — TELEPHONE (OUTPATIENT)
Dept: FAMILY MEDICINE CLINIC | Facility: CLINIC | Age: 25
End: 2020-07-15

## 2020-07-15 DIAGNOSIS — F90.0 ATTENTION DEFICIT HYPERACTIVITY DISORDER (ADHD), PREDOMINANTLY INATTENTIVE TYPE: ICD-10-CM

## 2020-07-15 RX ORDER — DEXTROAMPHETAMINE SACCHARATE, AMPHETAMINE ASPARTATE MONOHYDRATE, DEXTROAMPHETAMINE SULFATE AND AMPHETAMINE SULFATE 7.5; 7.5; 7.5; 7.5 MG/1; MG/1; MG/1; MG/1
30 CAPSULE, EXTENDED RELEASE ORAL EVERY MORNING
Qty: 30 CAPSULE | Refills: 0 | Status: SHIPPED | OUTPATIENT
Start: 2020-07-15 | End: 2020-08-12 | Stop reason: SDUPTHER

## 2020-08-12 ENCOUNTER — TELEPHONE (OUTPATIENT)
Dept: FAMILY MEDICINE CLINIC | Facility: CLINIC | Age: 25
End: 2020-08-12

## 2020-08-12 DIAGNOSIS — F90.0 ATTENTION DEFICIT HYPERACTIVITY DISORDER (ADHD), PREDOMINANTLY INATTENTIVE TYPE: ICD-10-CM

## 2020-08-12 RX ORDER — DEXTROAMPHETAMINE SACCHARATE, AMPHETAMINE ASPARTATE MONOHYDRATE, DEXTROAMPHETAMINE SULFATE AND AMPHETAMINE SULFATE 7.5; 7.5; 7.5; 7.5 MG/1; MG/1; MG/1; MG/1
30 CAPSULE, EXTENDED RELEASE ORAL EVERY MORNING
Qty: 30 CAPSULE | Refills: 0 | Status: SHIPPED | OUTPATIENT
Start: 2020-08-12 | End: 2020-09-14 | Stop reason: SDUPTHER

## 2020-08-12 NOTE — TELEPHONE ENCOUNTER
REFILL: amphetamine-dextroamphetamine XR (Adderall XR) 30 MG 24 hr capsule      [facility] #[id number] - [place], [place] - [address] AT [place] [address] & [address] - [phone]  - [phone] (Phone)  [phone] (Fax)

## 2020-09-14 DIAGNOSIS — F90.0 ATTENTION DEFICIT HYPERACTIVITY DISORDER (ADHD), PREDOMINANTLY INATTENTIVE TYPE: ICD-10-CM

## 2020-09-14 RX ORDER — DEXTROAMPHETAMINE SACCHARATE, AMPHETAMINE ASPARTATE MONOHYDRATE, DEXTROAMPHETAMINE SULFATE AND AMPHETAMINE SULFATE 7.5; 7.5; 7.5; 7.5 MG/1; MG/1; MG/1; MG/1
30 CAPSULE, EXTENDED RELEASE ORAL EVERY MORNING
Qty: 30 CAPSULE | Refills: 0 | Status: SHIPPED | OUTPATIENT
Start: 2020-09-14 | End: 2020-10-13 | Stop reason: SDUPTHER

## 2020-10-13 ENCOUNTER — TELEPHONE (OUTPATIENT)
Dept: FAMILY MEDICINE CLINIC | Facility: CLINIC | Age: 25
End: 2020-10-13

## 2020-10-13 DIAGNOSIS — F90.0 ATTENTION DEFICIT HYPERACTIVITY DISORDER (ADHD), PREDOMINANTLY INATTENTIVE TYPE: ICD-10-CM

## 2020-10-13 RX ORDER — DEXTROAMPHETAMINE SACCHARATE, AMPHETAMINE ASPARTATE MONOHYDRATE, DEXTROAMPHETAMINE SULFATE AND AMPHETAMINE SULFATE 7.5; 7.5; 7.5; 7.5 MG/1; MG/1; MG/1; MG/1
30 CAPSULE, EXTENDED RELEASE ORAL EVERY MORNING
Qty: 30 CAPSULE | Refills: 0 | Status: SHIPPED | OUTPATIENT
Start: 2020-10-13 | End: 2020-11-11 | Stop reason: SDUPTHER

## 2020-10-13 NOTE — TELEPHONE ENCOUNTER
PLEASE REFILL;    amphetamine-dextroamphetamine XR (Adderall XR) 30 MG 24 hr capsule          [facility] #[id number] [facility] [address] AT [facility] OF [facility] & [place] - [phone]  - [phone] (Phone)  [phone] (Fax)

## 2020-11-11 ENCOUNTER — TELEPHONE (OUTPATIENT)
Dept: FAMILY MEDICINE CLINIC | Facility: CLINIC | Age: 25
End: 2020-11-11

## 2020-11-11 DIAGNOSIS — F90.0 ATTENTION DEFICIT HYPERACTIVITY DISORDER (ADHD), PREDOMINANTLY INATTENTIVE TYPE: ICD-10-CM

## 2020-11-11 RX ORDER — DEXTROAMPHETAMINE SACCHARATE, AMPHETAMINE ASPARTATE MONOHYDRATE, DEXTROAMPHETAMINE SULFATE AND AMPHETAMINE SULFATE 7.5; 7.5; 7.5; 7.5 MG/1; MG/1; MG/1; MG/1
30 CAPSULE, EXTENDED RELEASE ORAL EVERY MORNING
Qty: 30 CAPSULE | Refills: 0 | Status: SHIPPED | OUTPATIENT
Start: 2020-11-11 | End: 2020-12-11 | Stop reason: SDUPTHER

## 2020-12-11 DIAGNOSIS — F90.0 ATTENTION DEFICIT HYPERACTIVITY DISORDER (ADHD), PREDOMINANTLY INATTENTIVE TYPE: ICD-10-CM

## 2020-12-11 RX ORDER — DEXTROAMPHETAMINE SACCHARATE, AMPHETAMINE ASPARTATE MONOHYDRATE, DEXTROAMPHETAMINE SULFATE AND AMPHETAMINE SULFATE 7.5; 7.5; 7.5; 7.5 MG/1; MG/1; MG/1; MG/1
30 CAPSULE, EXTENDED RELEASE ORAL EVERY MORNING
Qty: 30 CAPSULE | Refills: 0 | Status: SHIPPED | OUTPATIENT
Start: 2020-12-11 | End: 2021-01-08 | Stop reason: SDUPTHER

## 2021-01-08 DIAGNOSIS — F90.0 ATTENTION DEFICIT HYPERACTIVITY DISORDER (ADHD), PREDOMINANTLY INATTENTIVE TYPE: ICD-10-CM

## 2021-01-08 RX ORDER — DEXTROAMPHETAMINE SACCHARATE, AMPHETAMINE ASPARTATE MONOHYDRATE, DEXTROAMPHETAMINE SULFATE AND AMPHETAMINE SULFATE 7.5; 7.5; 7.5; 7.5 MG/1; MG/1; MG/1; MG/1
30 CAPSULE, EXTENDED RELEASE ORAL EVERY MORNING
Qty: 30 CAPSULE | Refills: 0 | Status: SHIPPED | OUTPATIENT
Start: 2021-01-08 | End: 2021-02-01 | Stop reason: SDUPTHER

## 2021-01-08 NOTE — TELEPHONE ENCOUNTER
Caller: Maria Del RosarioArlette monroy    Relationship: Self    Best call back number: [phone]     Medication needed:   Requested Prescriptions     Pending Prescriptions Disp Refills   • amphetamine-dextroamphetamine XR (Adderall XR) 30 MG 24 hr capsule 30 capsule 0     Sig: Take 1 capsule by mouth Every Morning       When do you need the refill by: 01/08/21    Does the patient have less than a 3 day supply:  [x] Yes  [] No    What is the patient's preferred pharmacy: [facility] #[id number] [address] AT SEC OF [place] [address] & [facility] [address]  - [phone]  - [phone] FX

## 2021-02-01 DIAGNOSIS — F90.0 ATTENTION DEFICIT HYPERACTIVITY DISORDER (ADHD), PREDOMINANTLY INATTENTIVE TYPE: ICD-10-CM

## 2021-02-01 NOTE — TELEPHONE ENCOUNTER
Caller: Arlette Mix Delia    Relationship: Self    Best call back number: *[phone]    Medication needed:   Requested Prescriptions     Pending Prescriptions Disp Refills   • amphetamine-dextroamphetamine XR (Adderall XR) 30 MG 24 hr capsule 30 capsule 0     Sig: Take 1 capsule by mouth Every Morning   • clindamycin (Cleocin-T) 1 % external solution 60 mL 5     Sig: Apply  topically to the appropriate area as directed 2 (Two) Times a Day.       When do you need the refill by: 2/01/21    What details did the patient provide when requesting the medication:  COMPLETELY OUT   Does the patient have less than a 3 day supply:  [x] Yes  [] No    What is the patient's preferred pharmacy: [facility] #[id number] [place] [address] [address] AT SEC OF [place] [address] & [facility] LINE  - [phone]  - [phone] FX

## 2021-02-02 RX ORDER — CLINDAMYCIN PHOSPHATE 11.9 MG/ML
SOLUTION TOPICAL 2 TIMES DAILY
Qty: 60 ML | Refills: 5 | Status: SHIPPED | OUTPATIENT
Start: 2021-02-02

## 2021-02-02 RX ORDER — DEXTROAMPHETAMINE SACCHARATE, AMPHETAMINE ASPARTATE MONOHYDRATE, DEXTROAMPHETAMINE SULFATE AND AMPHETAMINE SULFATE 7.5; 7.5; 7.5; 7.5 MG/1; MG/1; MG/1; MG/1
30 CAPSULE, EXTENDED RELEASE ORAL EVERY MORNING
Qty: 30 CAPSULE | Refills: 0 | Status: SHIPPED | OUTPATIENT
Start: 2021-02-02 | End: 2021-02-12

## 2021-02-12 ENCOUNTER — OFFICE VISIT (OUTPATIENT)
Dept: FAMILY MEDICINE CLINIC | Facility: CLINIC | Age: 26
End: 2021-02-12

## 2021-02-12 VITALS
HEART RATE: 123 BPM | TEMPERATURE: 97.1 F | DIASTOLIC BLOOD PRESSURE: 96 MMHG | SYSTOLIC BLOOD PRESSURE: 134 MMHG | BODY MASS INDEX: 28.62 KG/M2 | OXYGEN SATURATION: 96 % | WEIGHT: 180 LBS

## 2021-02-12 DIAGNOSIS — K59.1 FUNCTIONAL DIARRHEA: ICD-10-CM

## 2021-02-12 DIAGNOSIS — L74.510 AXILLARY HYPERHIDROSIS: ICD-10-CM

## 2021-02-12 DIAGNOSIS — K64.9 HEMORRHOIDS, UNSPECIFIED HEMORRHOID TYPE: ICD-10-CM

## 2021-02-12 DIAGNOSIS — Z79.899 HIGH RISK MEDICATION USE: ICD-10-CM

## 2021-02-12 DIAGNOSIS — F90.0 ATTENTION DEFICIT HYPERACTIVITY DISORDER (ADHD), PREDOMINANTLY INATTENTIVE TYPE: Primary | ICD-10-CM

## 2021-02-12 PROCEDURE — 99214 OFFICE O/P EST MOD 30 MIN: CPT | Performed by: FAMILY MEDICINE

## 2021-02-12 RX ORDER — GLYCOPYRROLATE 1 MG/1
1 TABLET ORAL 2 TIMES DAILY
Qty: 60 TABLET | Refills: 1 | Status: SHIPPED | OUTPATIENT
Start: 2021-02-12 | End: 2021-04-09

## 2021-02-12 RX ORDER — ALPRAZOLAM 0.5 MG/1
.25-.5 TABLET ORAL 3 TIMES DAILY PRN
COMMUNITY
Start: 2021-01-11 | End: 2021-04-21 | Stop reason: SDUPTHER

## 2021-02-12 RX ORDER — COLESEVELAM 180 1/1
1250 TABLET ORAL 2 TIMES DAILY WITH MEALS
Qty: 120 TABLET | Refills: 1 | Status: SHIPPED | OUTPATIENT
Start: 2021-02-12 | End: 2021-02-15

## 2021-02-12 RX ORDER — DEXTROAMPHETAMINE SACCHARATE, AMPHETAMINE ASPARTATE MONOHYDRATE, DEXTROAMPHETAMINE SULFATE AND AMPHETAMINE SULFATE 5; 5; 5; 5 MG/1; MG/1; MG/1; MG/1
40 CAPSULE, EXTENDED RELEASE ORAL EVERY MORNING
Qty: 60 CAPSULE | Refills: 0 | Status: SHIPPED | OUTPATIENT
Start: 2021-02-12 | End: 2021-03-08 | Stop reason: SDUPTHER

## 2021-02-12 NOTE — PROGRESS NOTES
Chief Complaint  Diarrhea (since cholecystectomy 2019) and Med Management    Subjective          Arlette Mix presents to [facility]  She has a history of ADD and has been on the same dose of Adderall XR for quite a while.  She does not feel like it is working as well as it once did and would like to increase the dose if possible.      She also reports continued problems with excessive underarm and hand sweating.  She has tried Drysol with mild improvement.  Her insurance will not cover Qbrexza or botox injections.  She is willing to try any medicine to help with perspiration.     She also reports a problematic hemorrhoid that flares up from time to time, especially when she is having diarrhea.    Diarrhea   This is a new problem. The current episode started more than 1 year ago. The problem occurs 5 to 10 times per day. The problem has been unchanged. The stool consistency is described as watery. Nothing aggravates the symptoms. Risk factors: cholecystectomy. She has tried change of diet and anti-motility drug for the symptoms. The treatment provided no relief.       Review of Systems   Gastrointestinal: Positive for diarrhea.     Objective   Vital Signs:   /96 (BP Location: Left arm, Patient Position: Sitting, Cuff Size: Adult)   Pulse (!) 123   Temp 97.1 °F (36.2 °C)   Wt 81.6 kg (180 lb)   SpO2 96%   BMI 28.62 kg/m²     Physical Exam  Constitutional:       General: She is not in acute distress.     Appearance: She is well-developed.   HENT:      Head: Normocephalic.   Eyes:      General: Lids are normal.      Conjunctiva/sclera: Conjunctivae normal.   Neck:      Musculoskeletal: Normal range of motion.      Thyroid: No thyroid mass or thyromegaly.      Trachea: Trachea normal.   Cardiovascular:      Rate and Rhythm: Normal rate and regular rhythm.      Heart sounds: Normal heart sounds.   Pulmonary:      Effort: Pulmonary effort is normal.      Breath sounds:  Normal breath sounds.   Abdominal:      Palpations: Abdomen is soft.   Lymphadenopathy:      Cervical: No cervical adenopathy.   Skin:     General: Skin is warm and dry.   Neurological:      Mental Status: She is alert and oriented to person, place, and time.   Psychiatric:         Attention and Perception: She is attentive.         Mood and Affect: Mood normal.         Speech: Speech normal.         Behavior: Behavior normal.        Result Review :                 Assessment and Plan    Diagnoses and all orders for this visit:    1. Attention deficit hyperactivity disorder (ADHD), predominantly inattentive type (Primary)  -     amphetamine-dextroamphetamine XR (Adderall XR) 20 MG 24 hr capsule; Take 2 capsules by mouth Every Morning  Dispense: 60 capsule; Refill: 0    2. High risk medication use  -     amphetamine-dextroamphetamine XR (Adderall XR) 20 MG 24 hr capsule; Take 2 capsules by mouth Every Morning  Dispense: 60 capsule; Refill: 0    3. Axillary hyperhidrosis  -     glycopyrrolate (ROBINUL) 1 MG tablet; Take 1 tablet by mouth 2 (Two) Times a Day.  Dispense: 60 tablet; Refill: 1    4. Hemorrhoids, unspecified hemorrhoid type  -     Ambulatory Referral to General Surgery    5. Functional diarrhea  -     colesevelam (Welchol) 625 MG tablet; Take 2 tablets by mouth 2 (Two) Times a Day With Meals.  Dispense: 120 tablet; Refill: 1        Follow Up   No follow-ups on file.  Patient was given instructions and counseling regarding her condition or for health maintenance advice. Please see specific information pulled into the AVS if appropriate.

## 2021-02-15 RX ORDER — MONTELUKAST SODIUM 4 MG/1
1 TABLET, CHEWABLE ORAL 2 TIMES DAILY
Qty: 60 TABLET | Refills: 5 | Status: SHIPPED | OUTPATIENT
Start: 2021-02-15 | End: 2021-02-15

## 2021-02-15 RX ORDER — MONTELUKAST SODIUM 4 MG/1
TABLET, CHEWABLE ORAL
Qty: 180 TABLET | Refills: 1 | Status: SHIPPED | OUTPATIENT
Start: 2021-02-15 | End: 2021-12-01

## 2021-03-08 ENCOUNTER — TELEPHONE (OUTPATIENT)
Dept: FAMILY MEDICINE CLINIC | Facility: CLINIC | Age: 26
End: 2021-03-08

## 2021-03-08 DIAGNOSIS — Z79.899 HIGH RISK MEDICATION USE: ICD-10-CM

## 2021-03-08 DIAGNOSIS — F90.0 ATTENTION DEFICIT HYPERACTIVITY DISORDER (ADHD), PREDOMINANTLY INATTENTIVE TYPE: ICD-10-CM

## 2021-03-08 RX ORDER — DEXTROAMPHETAMINE SACCHARATE, AMPHETAMINE ASPARTATE MONOHYDRATE, DEXTROAMPHETAMINE SULFATE AND AMPHETAMINE SULFATE 5; 5; 5; 5 MG/1; MG/1; MG/1; MG/1
40 CAPSULE, EXTENDED RELEASE ORAL EVERY MORNING
Qty: 60 CAPSULE | Refills: 0 | Status: SHIPPED | OUTPATIENT
Start: 2021-03-08 | End: 2021-03-31 | Stop reason: SDUPTHER

## 2021-03-08 NOTE — TELEPHONE ENCOUNTER
Caller: Arlette Mix    Relationship: Self    Best call back number: [phone]    Medication needed:   Requested Prescriptions     Pending Prescriptions Disp Refills   • amphetamine-dextroamphetamine XR (Adderall XR) 20 MG 24 hr capsule 60 capsule 0     Sig: Take 2 capsules by mouth Every Morning       When do you need the refill by:     What details did the patient provide when requesting the medication:     Does the patient have less than a 3 day supply:  [x] Yes  [] No    What is the patient's preferred pharmacy:    [facility]  [address] [place] [address]  [phone]

## 2021-03-09 ENCOUNTER — OFFICE VISIT (OUTPATIENT)
Dept: SURGERY | Facility: CLINIC | Age: 26
End: 2021-03-09

## 2021-03-09 ENCOUNTER — TELEPHONE (OUTPATIENT)
Dept: FAMILY MEDICINE CLINIC | Facility: CLINIC | Age: 26
End: 2021-03-09

## 2021-03-09 VITALS
HEART RATE: 125 BPM | HEIGHT: 66 IN | SYSTOLIC BLOOD PRESSURE: 159 MMHG | OXYGEN SATURATION: 100 % | BODY MASS INDEX: 28.83 KG/M2 | RESPIRATION RATE: 16 BRPM | TEMPERATURE: 98.2 F | WEIGHT: 179.4 LBS | DIASTOLIC BLOOD PRESSURE: 113 MMHG

## 2021-03-09 DIAGNOSIS — K64.2 GRADE III HEMORRHOIDS: Primary | ICD-10-CM

## 2021-03-09 DIAGNOSIS — K59.1 FUNCTIONAL DIARRHEA: ICD-10-CM

## 2021-03-09 DIAGNOSIS — K64.9 HEMORRHOIDS, UNSPECIFIED HEMORRHOID TYPE: Primary | ICD-10-CM

## 2021-03-09 PROCEDURE — 99203 OFFICE O/P NEW LOW 30 MIN: CPT | Performed by: SURGERY

## 2021-03-09 NOTE — TELEPHONE ENCOUNTER
PATIENT IS CALLING IN SHE STATES SHE DID GO SEE THE SURGEON TODAY AND HE ADVISED THAT SHE GET REFERRAL TO A GASTRO DOCTOR.    SHE IS WANTING TO KNOW HOW TO GO ABOUT GETTING THAT.      PLEASE ADVISE    CALLBACK NUMBER  [phone]

## 2021-03-09 NOTE — PROGRESS NOTES
"Subjective   Arlette Mix is a 25 y.o. female.   Chief Complaint   Patient presents with   • New Patient     Ref Dr. Manuel   • Hemorrhoids     BP (!) 159/113 (BP Location: Left arm, Patient Position: Sitting, Cuff Size: Adult)   Pulse (!) 125   Temp 98.2 °F (36.8 °C) (Temporal)   Resp 16   Ht 166.4 cm (65.5\")   Wt 81.4 kg (179 lb 6.4 oz)   SpO2 100%   BMI 29.40 kg/m²     HISTORY OF PRESENT ILLNESS:  25-year-old lady who comes in today to talk about some hemorrhoids that are causing him trouble.  She says that she started to have some issues with hemorrhoids that developed following her pregnancy.  The did not seem to bother her too much but she had gallbladder surgery back in 2019 and since then has had significant gastrointestinal distress.  She still has abdominal pain she is having 10-15 bowel movements a day sometimes she does feel like she needs to strain quite a bit.  She also has urgency to get to the bathroom following meals and has to stay close to the bathroom at all times.  She has had some occasional bleeding.  At present they are not very flared up.  She has been seen by her primary care provider recently and started her on what I believe is cholestyramine for possibly postcholecystectomy diarrhea.  She has never seen a gastroenterologist.      Outpatient Encounter Medications as of 3/9/2021   Medication Sig Dispense Refill   • ALPRAZolam (XANAX) 0.5 MG tablet Take 0.25-0.5 mg by mouth 3 (Three) Times a Day As Needed.     • amphetamine-dextroamphetamine XR (Adderall XR) 20 MG 24 hr capsule Take 2 capsules by mouth Every Morning 60 capsule 0   • clindamycin (Cleocin-T) 1 % external solution Apply  topically to the appropriate area as directed 2 (Two) Times a Day. 60 mL 5   • colestipol (COLESTID) 1 g tablet TAKE 1 TABLET BY MOUTH TWICE DAILY 180 tablet 1   • glycopyrrolate (ROBINUL) 1 MG tablet Take 1 tablet by mouth 2 (Two) Times a Day. 60 tablet 1     No facility-administered encounter " medications on file as of 3/9/2021.         The following portions of the patient's history were reviewed and updated as appropriate: allergies, current medications, past family history, past medical history, past social history, past surgical history and problem list.    Review of Systems  A complete review of systems has been obtained is positive for anal bleeding and diarrhea the remainder of the review of systems is negative  Objective   Physical Exam  Constitutional:       Appearance: Normal appearance. She is well-developed.   HENT:      Head: Normocephalic and atraumatic.   Eyes:      General: No scleral icterus.     Conjunctiva/sclera: Conjunctivae normal.   Neck:      Trachea: No tracheal deviation.   Cardiovascular:      Rate and Rhythm: Normal rate.      Pulses: Normal pulses.   Pulmonary:      Effort: Pulmonary effort is normal. No respiratory distress.   Abdominal:      General: There is no distension.      Palpations: Abdomen is soft.   Genitourinary:     Comments: On rectal inspection she has a very minimal anterior skin tag may be 2 or 3 mm.  The hemorrhoidal columns seem full but are not protruding there is no significant swelling abnormalities of the skin perianal tenderness to palpation no evidence of fissure  Musculoskeletal:         General: No swelling or tenderness.      Cervical back: No muscular tenderness.   Lymphadenopathy:      Cervical: No cervical adenopathy.   Skin:     General: Skin is warm and dry.   Neurological:      General: No focal deficit present.      Mental Status: She is alert. Mental status is at baseline.   Psychiatric:         Mood and Affect: Mood normal.         Behavior: Behavior normal.           Assessment/Plan   Diagnoses and all orders for this visit:    1. Grade III hemorrhoids (Primary)    Most likely consistent with grade 3 hemorrhoids related to her diarrhea and possibly pregnancy.  I have gone through informational pamphlet about hemorrhoids have recommended  some nonoperative therapy.  I most specifically recommend that she get her bowel function under control otherwise hemorrhoid surgery not only will be extremely uncomfortable to recover from without much bowel function also has high likelihood for recurrent anal problems.  She was very disappointed by the fact that I did not recommend surgery to her immediately   But she did understand.  I have offered her referral over to gastroenterology myself but it seems like her primary care doctor is treating her for her postcholecystectomy diarrhea and will defer back to her to decide if she wants to continue to manage this or refer to gastroenterology.  I also talked to her that gastroenterology consult in [place] typically does some hemorrhoidal banding if they end up performing endoscopy and seeing the hemorrhoids but the potential to be banded.  However bleeding is not a severe symptom of her hemorrhoids she mostly feels very subconscious about them.  I am willing to see her in the office again in several months once her bowel function is under better control to reevaluate I have also talked to her that if she is unhappy with the decision today she is free to get a second opinion about hemorrhoid surgery from any of the regional surgeons.    Alex Meyers MD  3/9/2021  11:27 AM EST    This note was created using Dragon Voice Recognition software.

## 2021-03-11 NOTE — TELEPHONE ENCOUNTER
Pt did not answer and her vm was full so I could not leave a msg.    HUB OK TO SHARE PREVIOUS MSG.

## 2021-03-31 DIAGNOSIS — F90.0 ATTENTION DEFICIT HYPERACTIVITY DISORDER (ADHD), PREDOMINANTLY INATTENTIVE TYPE: ICD-10-CM

## 2021-03-31 DIAGNOSIS — Z79.899 HIGH RISK MEDICATION USE: ICD-10-CM

## 2021-03-31 RX ORDER — DEXTROAMPHETAMINE SACCHARATE, AMPHETAMINE ASPARTATE MONOHYDRATE, DEXTROAMPHETAMINE SULFATE AND AMPHETAMINE SULFATE 5; 5; 5; 5 MG/1; MG/1; MG/1; MG/1
40 CAPSULE, EXTENDED RELEASE ORAL EVERY MORNING
Qty: 60 CAPSULE | Refills: 0 | Status: SHIPPED | OUTPATIENT
Start: 2021-03-31 | End: 2021-05-03 | Stop reason: SDUPTHER

## 2021-04-08 DIAGNOSIS — L74.510 AXILLARY HYPERHIDROSIS: ICD-10-CM

## 2021-04-09 RX ORDER — GLYCOPYRROLATE 1 MG/1
TABLET ORAL
Qty: 60 TABLET | Refills: 1 | Status: SHIPPED | OUTPATIENT
Start: 2021-04-09 | End: 2021-12-01

## 2021-04-20 ENCOUNTER — OFFICE (AMBULATORY)
Dept: URBAN - METROPOLITAN AREA CLINIC 64 | Facility: CLINIC | Age: 26
End: 2021-04-20

## 2021-04-20 VITALS
DIASTOLIC BLOOD PRESSURE: 98 MMHG | WEIGHT: 173 LBS | HEIGHT: 65 IN | HEART RATE: 148 BPM | SYSTOLIC BLOOD PRESSURE: 133 MMHG

## 2021-04-20 DIAGNOSIS — R19.7 DIARRHEA, UNSPECIFIED: ICD-10-CM

## 2021-04-20 DIAGNOSIS — R15.2 FECAL URGENCY: ICD-10-CM

## 2021-04-20 DIAGNOSIS — K62.89 OTHER SPECIFIED DISEASES OF ANUS AND RECTUM: ICD-10-CM

## 2021-04-20 DIAGNOSIS — R19.8 OTHER SPECIFIED SYMPTOMS AND SIGNS INVOLVING THE DIGESTIVE S: ICD-10-CM

## 2021-04-20 DIAGNOSIS — K92.1 MELENA: ICD-10-CM

## 2021-04-20 DIAGNOSIS — K62.5 HEMORRHAGE OF ANUS AND RECTUM: ICD-10-CM

## 2021-04-20 DIAGNOSIS — R11.0 NAUSEA: ICD-10-CM

## 2021-04-20 PROCEDURE — 99243 OFF/OP CNSLTJ NEW/EST LOW 30: CPT | Performed by: NURSE PRACTITIONER

## 2021-04-21 ENCOUNTER — TELEPHONE (OUTPATIENT)
Dept: FAMILY MEDICINE CLINIC | Facility: CLINIC | Age: 26
End: 2021-04-21

## 2021-04-21 DIAGNOSIS — F41.9 ANXIETY: Primary | ICD-10-CM

## 2021-04-21 RX ORDER — ALPRAZOLAM 0.5 MG/1
0.5 TABLET ORAL 3 TIMES DAILY PRN
Qty: 10 TABLET | Refills: 0 | Status: SHIPPED | OUTPATIENT
Start: 2021-04-21 | End: 2021-12-01

## 2021-04-21 NOTE — TELEPHONE ENCOUNTER
I called and spoke to the patient.  She is having a lot of anxiety about her scheduled colonoscopy tomorrow. I will send in a few doses of Xanax.

## 2021-04-21 NOTE — TELEPHONE ENCOUNTER
Caller: Arlette Mix    Relationship: Self    Best call back number: [phone]     What is the best time to reach you: ANYTIME    Who are you requesting to speak with DOCTOR OR MA      What was the call regarding: PATIENT IS HAVING A PROCEDURE DONE TOMORROW AND WOULD LIKE TO SPEAK WITH SOMEONE ASAP ABOUT IT.    Do you require a callback: YES

## 2021-04-22 ENCOUNTER — OFFICE (AMBULATORY)
Dept: URBAN - METROPOLITAN AREA PATHOLOGY 4 | Facility: PATHOLOGY | Age: 26
End: 2021-04-22

## 2021-04-22 ENCOUNTER — ON CAMPUS - OUTPATIENT (AMBULATORY)
Dept: URBAN - METROPOLITAN AREA HOSPITAL 2 | Facility: HOSPITAL | Age: 26
End: 2021-04-22

## 2021-04-22 VITALS
HEART RATE: 92 BPM | OXYGEN SATURATION: 92 % | HEIGHT: 65 IN | DIASTOLIC BLOOD PRESSURE: 80 MMHG | DIASTOLIC BLOOD PRESSURE: 70 MMHG | SYSTOLIC BLOOD PRESSURE: 130 MMHG | DIASTOLIC BLOOD PRESSURE: 100 MMHG | HEART RATE: 100 BPM | HEART RATE: 99 BPM | RESPIRATION RATE: 18 BRPM | HEART RATE: 116 BPM | WEIGHT: 170 LBS | HEART RATE: 87 BPM | RESPIRATION RATE: 16 BRPM | SYSTOLIC BLOOD PRESSURE: 107 MMHG | SYSTOLIC BLOOD PRESSURE: 123 MMHG | SYSTOLIC BLOOD PRESSURE: 135 MMHG | SYSTOLIC BLOOD PRESSURE: 114 MMHG | TEMPERATURE: 97 F | OXYGEN SATURATION: 100 % | DIASTOLIC BLOOD PRESSURE: 96 MMHG | DIASTOLIC BLOOD PRESSURE: 89 MMHG | HEART RATE: 97 BPM | SYSTOLIC BLOOD PRESSURE: 150 MMHG | DIASTOLIC BLOOD PRESSURE: 75 MMHG | HEART RATE: 83 BPM | SYSTOLIC BLOOD PRESSURE: 105 MMHG | DIASTOLIC BLOOD PRESSURE: 56 MMHG | HEART RATE: 89 BPM

## 2021-04-22 DIAGNOSIS — K44.9 DIAPHRAGMATIC HERNIA WITHOUT OBSTRUCTION OR GANGRENE: ICD-10-CM

## 2021-04-22 DIAGNOSIS — D12.3 BENIGN NEOPLASM OF TRANSVERSE COLON: ICD-10-CM

## 2021-04-22 DIAGNOSIS — K21.9 GASTRO-ESOPHAGEAL REFLUX DISEASE WITHOUT ESOPHAGITIS: ICD-10-CM

## 2021-04-22 DIAGNOSIS — K64.1 SECOND DEGREE HEMORRHOIDS: ICD-10-CM

## 2021-04-22 DIAGNOSIS — K31.9 DISEASE OF STOMACH AND DUODENUM, UNSPECIFIED: ICD-10-CM

## 2021-04-22 DIAGNOSIS — K31.89 OTHER DISEASES OF STOMACH AND DUODENUM: ICD-10-CM

## 2021-04-22 DIAGNOSIS — K64.4 RESIDUAL HEMORRHOIDAL SKIN TAGS: ICD-10-CM

## 2021-04-22 DIAGNOSIS — K62.5 HEMORRHAGE OF ANUS AND RECTUM: ICD-10-CM

## 2021-04-22 PROBLEM — K64.0 FIRST DEGREE HEMORRHOIDS: Status: ACTIVE | Noted: 2021-04-22

## 2021-04-22 PROBLEM — K63.5 POLYP OF COLON: Status: ACTIVE | Noted: 2021-04-22

## 2021-04-22 LAB
GI HISTOLOGY: A. UNSPECIFIED: (no result)
GI HISTOLOGY: B. SELECT: (no result)
GI HISTOLOGY: C. SELECT: (no result)
GI HISTOLOGY: D. UNSPECIFIED: (no result)
GI HISTOLOGY: E. SELECT: (no result)
GI HISTOLOGY: PDF REPORT: (no result)

## 2021-04-22 PROCEDURE — 45380 COLONOSCOPY AND BIOPSY: CPT | Mod: 59 | Performed by: INTERNAL MEDICINE

## 2021-04-22 PROCEDURE — 43239 EGD BIOPSY SINGLE/MULTIPLE: CPT | Performed by: INTERNAL MEDICINE

## 2021-04-22 PROCEDURE — 45385 COLONOSCOPY W/LESION REMOVAL: CPT | Performed by: INTERNAL MEDICINE

## 2021-04-22 PROCEDURE — 88305 TISSUE EXAM BY PATHOLOGIST: CPT | Performed by: INTERNAL MEDICINE

## 2021-04-22 RX ORDER — DICYCLOMINE HYDROCHLORIDE 10 MG/1
CAPSULE ORAL
Qty: 60 | Refills: 3 | Status: ACTIVE
Start: 2021-04-22

## 2021-04-22 RX ORDER — COLESTIPOL HYDROCHLORIDE 1 G/1
2 TABLET, FILM COATED ORAL
Qty: 60 | Refills: 11 | Status: COMPLETED
Start: 2021-04-22 | End: 2021-05-25

## 2021-05-03 DIAGNOSIS — Z79.899 HIGH RISK MEDICATION USE: ICD-10-CM

## 2021-05-03 DIAGNOSIS — F90.0 ATTENTION DEFICIT HYPERACTIVITY DISORDER (ADHD), PREDOMINANTLY INATTENTIVE TYPE: ICD-10-CM

## 2021-05-03 NOTE — TELEPHONE ENCOUNTER
Caller: Maria Del Rosario, Arletteluda Lin    Relationship: Self    Best call back number: [phone]    Medication needed:   Requested Prescriptions     Pending Prescriptions Disp Refills   • amphetamine-dextroamphetamine XR (Adderall XR) 20 MG 24 hr capsule 60 capsule 0     Sig: Take 2 capsules by mouth Every Morning       When do you need the refill by: ASAP    What additional details did the patient provide when requesting the medication: NONE LEFT    Does the patient have less than a 3 day supply:  [x] Yes  [] No    What is the patient's preferred pharmacy:    [facility] #[id number] [facility] [address] AT SEC OF [place] [address] & [facility] LINE  - [phone]  - [phone]   [phone]

## 2021-05-04 DIAGNOSIS — Z79.899 HIGH RISK MEDICATION USE: ICD-10-CM

## 2021-05-04 DIAGNOSIS — F90.0 ATTENTION DEFICIT HYPERACTIVITY DISORDER (ADHD), PREDOMINANTLY INATTENTIVE TYPE: ICD-10-CM

## 2021-05-04 RX ORDER — DEXTROAMPHETAMINE SACCHARATE, AMPHETAMINE ASPARTATE MONOHYDRATE, DEXTROAMPHETAMINE SULFATE AND AMPHETAMINE SULFATE 5; 5; 5; 5 MG/1; MG/1; MG/1; MG/1
40 CAPSULE, EXTENDED RELEASE ORAL EVERY MORNING
Qty: 60 CAPSULE | Refills: 0 | OUTPATIENT
Start: 2021-05-04

## 2021-05-04 RX ORDER — DEXTROAMPHETAMINE SACCHARATE, AMPHETAMINE ASPARTATE MONOHYDRATE, DEXTROAMPHETAMINE SULFATE AND AMPHETAMINE SULFATE 5; 5; 5; 5 MG/1; MG/1; MG/1; MG/1
40 CAPSULE, EXTENDED RELEASE ORAL EVERY MORNING
Qty: 60 CAPSULE | Refills: 0 | Status: SHIPPED | OUTPATIENT
Start: 2021-05-04 | End: 2021-05-26 | Stop reason: SDUPTHER

## 2021-05-25 ENCOUNTER — OFFICE (AMBULATORY)
Dept: URBAN - METROPOLITAN AREA CLINIC 64 | Facility: CLINIC | Age: 26
End: 2021-05-25

## 2021-05-25 VITALS
DIASTOLIC BLOOD PRESSURE: 91 MMHG | HEART RATE: 126 BPM | HEIGHT: 65 IN | WEIGHT: 172 LBS | SYSTOLIC BLOOD PRESSURE: 130 MMHG

## 2021-05-25 DIAGNOSIS — R10.30 LOWER ABDOMINAL PAIN, UNSPECIFIED: ICD-10-CM

## 2021-05-25 DIAGNOSIS — R19.7 DIARRHEA, UNSPECIFIED: ICD-10-CM

## 2021-05-25 DIAGNOSIS — K64.8 OTHER HEMORRHOIDS: ICD-10-CM

## 2021-05-25 DIAGNOSIS — Z86.010 PERSONAL HISTORY OF COLONIC POLYPS: ICD-10-CM

## 2021-05-25 PROCEDURE — 99214 OFFICE O/P EST MOD 30 MIN: CPT | Performed by: NURSE PRACTITIONER

## 2021-05-25 RX ORDER — HYOSCYAMINE SULFATE EXTENDED-RELEASE 0.38 MG/1
TABLET ORAL
Qty: 0 | Refills: 0 | Status: ACTIVE

## 2021-05-26 DIAGNOSIS — F90.0 ATTENTION DEFICIT HYPERACTIVITY DISORDER (ADHD), PREDOMINANTLY INATTENTIVE TYPE: ICD-10-CM

## 2021-05-26 DIAGNOSIS — Z79.899 HIGH RISK MEDICATION USE: ICD-10-CM

## 2021-05-26 RX ORDER — DEXTROAMPHETAMINE SACCHARATE, AMPHETAMINE ASPARTATE MONOHYDRATE, DEXTROAMPHETAMINE SULFATE AND AMPHETAMINE SULFATE 5; 5; 5; 5 MG/1; MG/1; MG/1; MG/1
40 CAPSULE, EXTENDED RELEASE ORAL EVERY MORNING
Qty: 60 CAPSULE | Refills: 0 | Status: SHIPPED | OUTPATIENT
Start: 2021-05-26 | End: 2021-06-21 | Stop reason: SDUPTHER

## 2021-06-21 DIAGNOSIS — F90.0 ATTENTION DEFICIT HYPERACTIVITY DISORDER (ADHD), PREDOMINANTLY INATTENTIVE TYPE: ICD-10-CM

## 2021-06-21 DIAGNOSIS — Z79.899 HIGH RISK MEDICATION USE: ICD-10-CM

## 2021-06-21 RX ORDER — DEXTROAMPHETAMINE SACCHARATE, AMPHETAMINE ASPARTATE MONOHYDRATE, DEXTROAMPHETAMINE SULFATE AND AMPHETAMINE SULFATE 5; 5; 5; 5 MG/1; MG/1; MG/1; MG/1
40 CAPSULE, EXTENDED RELEASE ORAL EVERY MORNING
Qty: 60 CAPSULE | Refills: 0 | Status: SHIPPED | OUTPATIENT
Start: 2021-06-21 | End: 2021-07-26 | Stop reason: SDUPTHER

## 2021-07-02 ENCOUNTER — APPOINTMENT (OUTPATIENT)
Dept: ULTRASOUND IMAGING | Facility: HOSPITAL | Age: 26
End: 2021-07-02

## 2021-07-02 ENCOUNTER — HOSPITAL ENCOUNTER (EMERGENCY)
Facility: HOSPITAL | Age: 26
Discharge: HOME | End: 2021-07-02
Attending: EMERGENCY MEDICINE | Admitting: EMERGENCY MEDICINE

## 2021-07-02 VITALS
HEART RATE: 95 BPM | DIASTOLIC BLOOD PRESSURE: 89 MMHG | BODY MASS INDEX: 27.81 KG/M2 | OXYGEN SATURATION: 100 % | TEMPERATURE: 98 F | WEIGHT: 166.89 LBS | RESPIRATION RATE: 16 BRPM | SYSTOLIC BLOOD PRESSURE: 137 MMHG | HEIGHT: 65 IN

## 2021-07-02 DIAGNOSIS — N93.9 ABNORMAL VAGINAL BLEEDING: Primary | ICD-10-CM

## 2021-07-02 LAB
ANION GAP SERPL CALCULATED.3IONS-SCNC: 10 MMOL/L (ref 5–15)
B-HCG UR QL: NEGATIVE
BASOPHILS # BLD AUTO: 0 10*3/MM3 (ref 0–0.2)
BASOPHILS NFR BLD AUTO: 0.5 % (ref 0–1.5)
BUN SERPL-MCNC: 10 MG/DL (ref 6–20)
BUN/CREAT SERPL: 13.2 (ref 7–25)
CALCIUM SPEC-SCNC: 8.5 MG/DL (ref 8.6–10.5)
CHLORIDE SERPL-SCNC: 104 MMOL/L (ref 98–107)
CO2 SERPL-SCNC: 25 MMOL/L (ref 22–29)
CREAT SERPL-MCNC: 0.76 MG/DL (ref 0.57–1)
DEPRECATED RDW RBC AUTO: 38.1 FL (ref 37–54)
EOSINOPHIL # BLD AUTO: 0.1 10*3/MM3 (ref 0–0.4)
EOSINOPHIL NFR BLD AUTO: 2.3 % (ref 0.3–6.2)
ERYTHROCYTE [DISTWIDTH] IN BLOOD BY AUTOMATED COUNT: 12 % (ref 12.3–15.4)
GFR SERPL CREATININE-BSD FRML MDRD: 93 ML/MIN/1.73
GLUCOSE SERPL-MCNC: 103 MG/DL (ref 65–99)
HCT VFR BLD AUTO: 39.4 % (ref 34–46.6)
HGB BLD-MCNC: 13.6 G/DL (ref 12–15.9)
LYMPHOCYTES # BLD AUTO: 1.1 10*3/MM3 (ref 0.7–3.1)
LYMPHOCYTES NFR BLD AUTO: 16.6 % (ref 19.6–45.3)
MCH RBC QN AUTO: 30.8 PG (ref 26.6–33)
MCHC RBC AUTO-ENTMCNC: 34.4 G/DL (ref 31.5–35.7)
MCV RBC AUTO: 89.5 FL (ref 79–97)
MONOCYTES # BLD AUTO: 0.5 10*3/MM3 (ref 0.1–0.9)
MONOCYTES NFR BLD AUTO: 7.7 % (ref 5–12)
NEUTROPHILS NFR BLD AUTO: 4.6 10*3/MM3 (ref 1.7–7)
NEUTROPHILS NFR BLD AUTO: 72.9 % (ref 42.7–76)
NRBC BLD AUTO-RTO: 0.1 /100 WBC (ref 0–0.2)
PLATELET # BLD AUTO: 187 10*3/MM3 (ref 140–450)
PMV BLD AUTO: 8.1 FL (ref 6–12)
POTASSIUM SERPL-SCNC: 3.5 MMOL/L (ref 3.5–5.2)
RBC # BLD AUTO: 4.4 10*6/MM3 (ref 3.77–5.28)
SODIUM SERPL-SCNC: 139 MMOL/L (ref 136–145)
WBC # BLD AUTO: 6.3 10*3/MM3 (ref 3.4–10.8)

## 2021-07-02 PROCEDURE — 85025 COMPLETE CBC W/AUTO DIFF WBC: CPT | Performed by: EMERGENCY MEDICINE

## 2021-07-02 PROCEDURE — 99283 EMERGENCY DEPT VISIT LOW MDM: CPT

## 2021-07-02 PROCEDURE — 76856 US EXAM PELVIC COMPLETE: CPT

## 2021-07-02 PROCEDURE — 76830 TRANSVAGINAL US NON-OB: CPT

## 2021-07-02 PROCEDURE — 80048 BASIC METABOLIC PNL TOTAL CA: CPT | Performed by: EMERGENCY MEDICINE

## 2021-07-02 PROCEDURE — 81025 URINE PREGNANCY TEST: CPT | Performed by: EMERGENCY MEDICINE

## 2021-07-02 NOTE — ED PROVIDER NOTES
Subjective   Chief complaint: Heavy vaginal bleeding    25-year-old female presents with heavy vaginal bleeding.  Patient states she had an IUD placed about a month ago.  She has had some light bleeding ever since that time but yesterday the bleeding became more intense.  She has also had some pelvic cramping.  She denies any discharge.  She has had no fever.  She denies any other complaints.  She states she is just concerned because she has had a history of anemia in the past.  She called her OB/GYN and they recommended she come to the emergency room.      History provided by:  Patient      Review of Systems   Constitutional: Negative for fever.   HENT: Negative for congestion and sore throat.    Eyes: Negative for redness.   Respiratory: Negative for cough and shortness of breath.    Cardiovascular: Negative for chest pain.   Gastrointestinal: Negative for abdominal pain, diarrhea and vomiting.   Genitourinary: Positive for pelvic pain and vaginal bleeding. Negative for dysuria and vaginal discharge.   Musculoskeletal: Negative for back pain.   Skin: Negative for rash.   Neurological: Negative for dizziness and headaches.   Psychiatric/Behavioral: Negative for confusion.       Past Medical History:   Diagnosis Date   • Anxiety    • Right radial head fracture 2019       No Known Allergies    Past Surgical History:   Procedure Laterality Date   •  SECTION     • CHOLECYSTECTOMY  2019       Family History   Problem Relation Age of Onset   • Migraines Mother        Social History     Socioeconomic History   • Marital status: Single     Spouse name: Not on file   • Number of children: Not on file   • Years of education: Not on file   • Highest education level: Not on file   Tobacco Use   • Smoking status: Never Smoker   • Smokeless tobacco: Never Used   Substance and Sexual Activity   • Alcohol use: Yes     Comment: caffeine 1c qd   • Drug use: Never   • Sexual activity: Defer       BP (!) 145/105 (BP  "Location: Left arm, Patient Position: Sitting)   Pulse 108   Temp 98 °F (36.7 °C) (Oral)   Resp 19   Ht 165.1 cm (65\")   Wt 75.7 kg (166 lb 14.2 oz)   LMP 07/02/2021   SpO2 99%   BMI 27.77 kg/m²       Objective   Physical Exam  Vitals and nursing note reviewed.   Constitutional:       Appearance: Normal appearance. She is well-developed.   HENT:      Head: Normocephalic and atraumatic.   Eyes:      Pupils: Pupils are equal, round, and reactive to light.   Cardiovascular:      Rate and Rhythm: Normal rate and regular rhythm.      Heart sounds: Normal heart sounds.   Pulmonary:      Effort: Pulmonary effort is normal. No respiratory distress.      Breath sounds: Normal breath sounds.   Abdominal:      General: Bowel sounds are normal.      Palpations: Abdomen is soft.      Tenderness: There is no abdominal tenderness.   Genitourinary:     Comments: External exam normal, speculum exam shows scant dark blood in the vaginal vault with no active bleeding.  There is no other discharge.  No cervical motion tenderness or adnexal tenderness.  Musculoskeletal:         General: Normal range of motion.      Cervical back: Normal range of motion and neck supple.   Skin:     General: Skin is warm and dry.   Neurological:      Mental Status: She is alert and oriented to person, place, and time.         Procedures           ED Course      Results for orders placed or performed during the hospital encounter of 07/02/21   Basic Metabolic Panel    Specimen: Blood   Result Value Ref Range    Glucose 103 (H) 65 - 99 mg/dL    BUN 10 6 - 20 mg/dL    Creatinine 0.76 0.57 - 1.00 mg/dL    Sodium 139 136 - 145 mmol/L    Potassium 3.5 3.5 - 5.2 mmol/L    Chloride 104 98 - 107 mmol/L    CO2 25.0 22.0 - 29.0 mmol/L    Calcium 8.5 (L) 8.6 - 10.5 mg/dL    eGFR Non African Amer 93 >60 mL/min/1.73    BUN/Creatinine Ratio 13.2 7.0 - 25.0    Anion Gap 10.0 5.0 - 15.0 mmol/L   Pregnancy, Urine - Urine, Clean Catch    Specimen: Urine, Clean Catch "   Result Value Ref Range    HCG, Urine QL Negative Negative   CBC Auto Differential    Specimen: Blood   Result Value Ref Range    WBC 6.30 3.40 - 10.80 10*3/mm3    RBC 4.40 3.77 - 5.28 10*6/mm3    Hemoglobin 13.6 12.0 - 15.9 g/dL    Hematocrit 39.4 34.0 - 46.6 %    MCV 89.5 79.0 - 97.0 fL    MCH 30.8 26.6 - 33.0 pg    MCHC 34.4 31.5 - 35.7 g/dL    RDW 12.0 (L) 12.3 - 15.4 %    RDW-SD 38.1 37.0 - 54.0 fl    MPV 8.1 6.0 - 12.0 fL    Platelets 187 140 - 450 10*3/mm3    Neutrophil % 72.9 42.7 - 76.0 %    Lymphocyte % 16.6 (L) 19.6 - 45.3 %    Monocyte % 7.7 5.0 - 12.0 %    Eosinophil % 2.3 0.3 - 6.2 %    Basophil % 0.5 0.0 - 1.5 %    Neutrophils, Absolute 4.60 1.70 - 7.00 10*3/mm3    Lymphocytes, Absolute 1.10 0.70 - 3.10 10*3/mm3    Monocytes, Absolute 0.50 0.10 - 0.90 10*3/mm3    Eosinophils, Absolute 0.10 0.00 - 0.40 10*3/mm3    Basophils, Absolute 0.00 0.00 - 0.20 10*3/mm3    nRBC 0.1 0.0 - 0.2 /100 WBC     US Non-ob Transvaginal    Result Date: 7/2/2021  1.IUD in expected position in the endometrial cavity. Endometrial thickness of 5 mm. 2.Normal sonographic appearance of the ovaries.  Electronically Signed By-Brandy Marquez MD On:7/2/2021 7:24 PM This report was finalized on [id number] by  Brandy Marquez MD.    US Pelvis Complete    Result Date: 7/2/2021  1.IUD in expected position in the endometrial cavity. Endometrial thickness of 5 mm. 2.Normal sonographic appearance of the ovaries.  Electronically Signed By-Brandy Marquez MD On:7/2/2021 7:24 PM This report was finalized on [id number] by  Brandy Marquez MD.                                         MDM   Patient had the above evaluation.  Results were discussed with the patient.  Patient remains well-appearing in the emergency room.  Work-up has been unremarkable.  White blood cell count is normal.  Hemoglobin is 13.6.  Pregnancy test is negative.  BMP is unremarkable.  Pelvic ultrasound showed no acute abnormality and IUD is in expected position.   Patient will be discharged to follow-up with her gynecologist.      Final diagnoses:   Abnormal vaginal bleeding       ED Disposition  ED Disposition     ED Disposition Condition Comment    Discharge Stable           Rosie Manuel MD  [address] [address]  [phone]    Call in 2 days           Medication List      No changes were made to your prescriptions during this visit.          Donovan Sanderson MD  07/02/21 1948

## 2021-07-02 NOTE — DISCHARGE INSTRUCTIONS
Follow-up with your primary doctor and gynecologist.  Return to the emergency room for any new or worsening symptoms or if you have any other questions or concerns.

## 2021-07-23 ENCOUNTER — OFFICE (AMBULATORY)
Dept: URBAN - METROPOLITAN AREA CLINIC 64 | Facility: CLINIC | Age: 26
End: 2021-07-23
Payer: COMMERCIAL

## 2021-07-23 VITALS — HEIGHT: 65 IN

## 2021-07-23 DIAGNOSIS — K64.1 SECOND DEGREE HEMORRHOIDS: ICD-10-CM

## 2021-07-23 PROCEDURE — 46221 LIGATION OF HEMORRHOID(S): CPT | Performed by: INTERNAL MEDICINE

## 2021-07-26 DIAGNOSIS — Z79.899 HIGH RISK MEDICATION USE: ICD-10-CM

## 2021-07-26 DIAGNOSIS — F90.0 ATTENTION DEFICIT HYPERACTIVITY DISORDER (ADHD), PREDOMINANTLY INATTENTIVE TYPE: ICD-10-CM

## 2021-07-26 RX ORDER — DEXTROAMPHETAMINE SACCHARATE, AMPHETAMINE ASPARTATE MONOHYDRATE, DEXTROAMPHETAMINE SULFATE AND AMPHETAMINE SULFATE 5; 5; 5; 5 MG/1; MG/1; MG/1; MG/1
40 CAPSULE, EXTENDED RELEASE ORAL EVERY MORNING
Qty: 60 CAPSULE | Refills: 0 | Status: SHIPPED | OUTPATIENT
Start: 2021-07-26 | End: 2021-08-20 | Stop reason: SDUPTHER

## 2021-07-26 NOTE — TELEPHONE ENCOUNTER
Caller: Maria Del Rosario, Arletteluda Lin    Relationship: Self    Best call back number: [phone]    Medication needed:   Requested Prescriptions     Pending Prescriptions Disp Refills   • amphetamine-dextroamphetamine XR (Adderall XR) 20 MG 24 hr capsule 60 capsule 0     Sig: Take 2 capsules by mouth Every Morning       When do you need the refill by:ASAP    What additional details did the patient provide when requesting the medication: NO MEDICINE LEFT    Does the patient have less than a 3 day supply:  [x] Yes  [] No    What is the patient's preferred pharmacy: [facility] #[id number] [facility] [address] AT SEC OF [place] [address] [address] & [facility] - [phone]  - [phone] FX

## 2021-08-20 DIAGNOSIS — Z79.899 HIGH RISK MEDICATION USE: ICD-10-CM

## 2021-08-20 DIAGNOSIS — F90.0 ATTENTION DEFICIT HYPERACTIVITY DISORDER (ADHD), PREDOMINANTLY INATTENTIVE TYPE: ICD-10-CM

## 2021-08-20 RX ORDER — DEXTROAMPHETAMINE SACCHARATE, AMPHETAMINE ASPARTATE MONOHYDRATE, DEXTROAMPHETAMINE SULFATE AND AMPHETAMINE SULFATE 5; 5; 5; 5 MG/1; MG/1; MG/1; MG/1
40 CAPSULE, EXTENDED RELEASE ORAL EVERY MORNING
Qty: 60 CAPSULE | Refills: 0 | Status: SHIPPED | OUTPATIENT
Start: 2021-08-20 | End: 2021-09-16 | Stop reason: SDUPTHER

## 2021-08-20 NOTE — TELEPHONE ENCOUNTER
Caller: Maria Del RosarioArlette monroy    Relationship: Self    Best call back number: [phone]    Medication needed:   Requested Prescriptions     Pending Prescriptions Disp Refills   • amphetamine-dextroamphetamine XR (Adderall XR) 20 MG 24 hr capsule 60 capsule 0     Sig: Take 2 capsules by mouth Every Morning       When do you need the refill by: ASAP     Does the patient have less than a 3 day supply:  [x] Yes  [] No    What is the patient's preferred pharmacy: [facility] #[id number] [place] [address] AT SEC OF [address] [address] & [facility] - [phone]  - [phone] FX

## 2021-09-16 DIAGNOSIS — F90.0 ATTENTION DEFICIT HYPERACTIVITY DISORDER (ADHD), PREDOMINANTLY INATTENTIVE TYPE: ICD-10-CM

## 2021-09-16 DIAGNOSIS — Z79.899 HIGH RISK MEDICATION USE: ICD-10-CM

## 2021-09-16 NOTE — TELEPHONE ENCOUNTER
Caller: Maria Del Rosario, Arletteluda Lin    Relationship: Self    Best call back number: [phone]   Medication needed:   Requested Prescriptions     Pending Prescriptions Disp Refills   • amphetamine-dextroamphetamine XR (Adderall XR) 20 MG 24 hr capsule 60 capsule 0     Sig: Take 2 capsules by mouth Every Morning       When do you need the refill by: 09/16/21    What additional details did the patient provide when requesting the medication:     Does the patient have less than a 3 day supply:  [x] Yes  [] No    What is the patient's preferred pharmacy: [facility] #[id number] [facility] [address] AT SEC OF [address] [address] & [facility] - [phone]  - [phone] FX

## 2021-09-17 RX ORDER — DEXTROAMPHETAMINE SACCHARATE, AMPHETAMINE ASPARTATE MONOHYDRATE, DEXTROAMPHETAMINE SULFATE AND AMPHETAMINE SULFATE 5; 5; 5; 5 MG/1; MG/1; MG/1; MG/1
40 CAPSULE, EXTENDED RELEASE ORAL EVERY MORNING
Qty: 60 CAPSULE | Refills: 0 | Status: SHIPPED | OUTPATIENT
Start: 2021-09-17 | End: 2021-10-13 | Stop reason: SDUPTHER

## 2021-10-13 ENCOUNTER — TELEPHONE (OUTPATIENT)
Dept: FAMILY MEDICINE CLINIC | Facility: CLINIC | Age: 26
End: 2021-10-13

## 2021-10-13 DIAGNOSIS — Z79.899 HIGH RISK MEDICATION USE: ICD-10-CM

## 2021-10-13 DIAGNOSIS — F90.0 ATTENTION DEFICIT HYPERACTIVITY DISORDER (ADHD), PREDOMINANTLY INATTENTIVE TYPE: ICD-10-CM

## 2021-10-13 RX ORDER — DEXTROAMPHETAMINE SACCHARATE, AMPHETAMINE ASPARTATE MONOHYDRATE, DEXTROAMPHETAMINE SULFATE AND AMPHETAMINE SULFATE 5; 5; 5; 5 MG/1; MG/1; MG/1; MG/1
40 CAPSULE, EXTENDED RELEASE ORAL EVERY MORNING
Qty: 60 CAPSULE | Refills: 0 | Status: SHIPPED | OUTPATIENT
Start: 2021-10-13 | End: 2021-11-15 | Stop reason: SDUPTHER

## 2021-10-13 NOTE — TELEPHONE ENCOUNTER
Caller: Arlette Mix    Relationship: Self      Medication requested (name and dosage):   amphetamine-dextroamphetamine XR (Adderall XR) 20 MG 24 hr capsule  40 mg, Every Morning       Pharmacy where request should be sent:   [facility] #[id number] [place], [place] - [facility] [facility] AT [address] [address] & [address]  - [phone]  - [phone]   [phone]  Additional details provided by patient:     Best call back number: [phone]    Does the patient have less than a 3 day supply:  [x] Yes  [] No    Ector Lagunas Rep   10/13/21 11:42 EDT

## 2021-11-15 DIAGNOSIS — Z79.899 HIGH RISK MEDICATION USE: ICD-10-CM

## 2021-11-15 DIAGNOSIS — F90.0 ATTENTION DEFICIT HYPERACTIVITY DISORDER (ADHD), PREDOMINANTLY INATTENTIVE TYPE: ICD-10-CM

## 2021-11-15 RX ORDER — DEXTROAMPHETAMINE SACCHARATE, AMPHETAMINE ASPARTATE MONOHYDRATE, DEXTROAMPHETAMINE SULFATE AND AMPHETAMINE SULFATE 5; 5; 5; 5 MG/1; MG/1; MG/1; MG/1
40 CAPSULE, EXTENDED RELEASE ORAL EVERY MORNING
Qty: 60 CAPSULE | Refills: 0 | Status: CANCELLED | OUTPATIENT
Start: 2021-11-15

## 2021-11-15 RX ORDER — DEXTROAMPHETAMINE SACCHARATE, AMPHETAMINE ASPARTATE MONOHYDRATE, DEXTROAMPHETAMINE SULFATE AND AMPHETAMINE SULFATE 5; 5; 5; 5 MG/1; MG/1; MG/1; MG/1
40 CAPSULE, EXTENDED RELEASE ORAL EVERY MORNING
Qty: 60 CAPSULE | Refills: 0 | Status: SHIPPED | OUTPATIENT
Start: 2021-11-15 | End: 2021-11-16 | Stop reason: SDUPTHER

## 2021-11-16 DIAGNOSIS — F90.0 ATTENTION DEFICIT HYPERACTIVITY DISORDER (ADHD), PREDOMINANTLY INATTENTIVE TYPE: ICD-10-CM

## 2021-11-16 DIAGNOSIS — Z79.899 HIGH RISK MEDICATION USE: ICD-10-CM

## 2021-11-16 NOTE — TELEPHONE ENCOUNTER
PATIENT NEEDS REFILL ON:amphetamine-dextroamphetamine XR (Adderall XR) 20 MG 24 hr capsule     PATIENT CAN BE REACHED ON: [phone]     PHARMACY [facility] - [place], [place] - [facility] - [phone]  - [phone]   [phone]

## 2021-11-16 NOTE — TELEPHONE ENCOUNTER
PATIENT CALLED TO CHECK THE STATUS OF THIS REQUEST, STATING THAT THE ORIGINAL REFILL SENT ON 11/15/21 WENT TO THE INCORRECT PHARMACY.    PLEASE ADVISE   [phone]     [facility] - [place], [place] - [address] [place] [address] - [phone]  - [phone]   [phone]

## 2021-11-17 RX ORDER — DEXTROAMPHETAMINE SACCHARATE, AMPHETAMINE ASPARTATE MONOHYDRATE, DEXTROAMPHETAMINE SULFATE AND AMPHETAMINE SULFATE 5; 5; 5; 5 MG/1; MG/1; MG/1; MG/1
40 CAPSULE, EXTENDED RELEASE ORAL EVERY MORNING
Qty: 60 CAPSULE | Refills: 0 | Status: SHIPPED | OUTPATIENT
Start: 2021-11-17 | End: 2021-12-13 | Stop reason: SDUPTHER

## 2021-12-01 ENCOUNTER — LAB (OUTPATIENT)
Dept: FAMILY MEDICINE CLINIC | Facility: CLINIC | Age: 26
End: 2021-12-01

## 2021-12-01 ENCOUNTER — OFFICE VISIT (OUTPATIENT)
Dept: FAMILY MEDICINE CLINIC | Facility: CLINIC | Age: 26
End: 2021-12-01

## 2021-12-01 VITALS
BODY MASS INDEX: 26.49 KG/M2 | OXYGEN SATURATION: 98 % | DIASTOLIC BLOOD PRESSURE: 96 MMHG | WEIGHT: 159 LBS | HEIGHT: 65 IN | SYSTOLIC BLOOD PRESSURE: 135 MMHG | HEART RATE: 110 BPM | TEMPERATURE: 97.8 F

## 2021-12-01 DIAGNOSIS — Z20.2 STD EXPOSURE: ICD-10-CM

## 2021-12-01 DIAGNOSIS — N89.8 VAGINAL DISCHARGE: Primary | ICD-10-CM

## 2021-12-01 LAB
C TRACH RRNA CVX QL NAA+PROBE: DETECTED
N GONORRHOEA RRNA SPEC QL NAA+PROBE: NOT DETECTED

## 2021-12-01 PROCEDURE — 87340 HEPATITIS B SURFACE AG IA: CPT | Performed by: FAMILY MEDICINE

## 2021-12-01 PROCEDURE — 87591 N.GONORRHOEAE DNA AMP PROB: CPT | Performed by: FAMILY MEDICINE

## 2021-12-01 PROCEDURE — 86780 TREPONEMA PALLIDUM: CPT | Performed by: FAMILY MEDICINE

## 2021-12-01 PROCEDURE — 99213 OFFICE O/P EST LOW 20 MIN: CPT | Performed by: FAMILY MEDICINE

## 2021-12-01 PROCEDURE — 87491 CHLMYD TRACH DNA AMP PROBE: CPT | Performed by: FAMILY MEDICINE

## 2021-12-01 PROCEDURE — G0432 EIA HIV-1/HIV-2 SCREEN: HCPCS | Performed by: FAMILY MEDICINE

## 2021-12-01 PROCEDURE — 86803 HEPATITIS C AB TEST: CPT | Performed by: FAMILY MEDICINE

## 2021-12-01 PROCEDURE — 86696 HERPES SIMPLEX TYPE 2 TEST: CPT | Performed by: FAMILY MEDICINE

## 2021-12-01 PROCEDURE — 36415 COLL VENOUS BLD VENIPUNCTURE: CPT | Performed by: FAMILY MEDICINE

## 2021-12-01 RX ORDER — AZITHROMYCIN 500 MG/1
1000 TABLET, FILM COATED ORAL ONCE
Qty: 2 TABLET | Refills: 0 | Status: SHIPPED | OUTPATIENT
Start: 2021-12-01 | End: 2021-12-01
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
## 2021-12-02 ENCOUNTER — TELEPHONE (OUTPATIENT)
Dept: FAMILY MEDICINE CLINIC | Facility: CLINIC | Age: 26
End: 2021-12-02

## 2021-12-02 LAB
HBV SURFACE AG SERPL QL IA: NORMAL
HCV AB SER DONR QL: NORMAL
HIV1+2 AB SER QL: NORMAL
HSV2 IGG SER IA-ACNC: <0.91 INDEX (ref 0–0.9)
TREPONEMA PALLIDUM IGG+IGM AB [PRESENCE] IN SERUM OR PLASMA BY IMMUNOASSAY: NON REACTIVE
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
## 2021-12-13 ENCOUNTER — TELEPHONE (OUTPATIENT)
Dept: FAMILY MEDICINE CLINIC | Facility: CLINIC | Age: 26
End: 2021-12-13

## 2021-12-13 DIAGNOSIS — F90.0 ATTENTION DEFICIT HYPERACTIVITY DISORDER (ADHD), PREDOMINANTLY INATTENTIVE TYPE: ICD-10-CM

## 2021-12-13 DIAGNOSIS — Z79.899 HIGH RISK MEDICATION USE: ICD-10-CM

## 2021-12-13 RX ORDER — DEXTROAMPHETAMINE SACCHARATE, AMPHETAMINE ASPARTATE MONOHYDRATE, DEXTROAMPHETAMINE SULFATE AND AMPHETAMINE SULFATE 5; 5; 5; 5 MG/1; MG/1; MG/1; MG/1
40 CAPSULE, EXTENDED RELEASE ORAL EVERY MORNING
Qty: 60 CAPSULE | Refills: 0 | Status: SHIPPED | OUTPATIENT
Start: 2021-12-13 | End: 2022-01-10 | Stop reason: SDUPTHER

## 2021-12-13 NOTE — TELEPHONE ENCOUNTER
Caller: Maria Del Rosario, Arletteluda Lin    Relationship: Self    Best call back number: [phone]    Requested Prescriptions:   Requested Prescriptions     Pending Prescriptions Disp Refills   • amphetamine-dextroamphetamine XR (Adderall XR) 20 MG 24 hr capsule 60 capsule 0     Sig: Take 2 capsules by mouth Every Morning        Pharmacy where request should be sent: [id number] - [facility] [address] [facility] - [phone]  - [phone] FX     Additional details provided by patient: PATIENT IS OUT OF THIS MEDICATION  Does the patient have less than a 3 day supply:  [x] Yes  [] No    Ector Denson Rep   12/13/21 13:10 EST

## 2022-01-04 ENCOUNTER — TELEPHONE (OUTPATIENT)
Dept: FAMILY MEDICINE CLINIC | Facility: CLINIC | Age: 27
End: 2022-01-04

## 2022-01-04 ENCOUNTER — TELEMEDICINE (OUTPATIENT)
Dept: FAMILY MEDICINE CLINIC | Facility: CLINIC | Age: 27
End: 2022-01-04

## 2022-01-04 DIAGNOSIS — F32.1 CURRENT MODERATE EPISODE OF MAJOR DEPRESSIVE DISORDER, UNSPECIFIED WHETHER RECURRENT: ICD-10-CM

## 2022-01-04 DIAGNOSIS — F41.9 ANXIETY: Primary | ICD-10-CM

## 2022-01-04 PROCEDURE — 99213 OFFICE O/P EST LOW 20 MIN: CPT | Performed by: FAMILY MEDICINE

## 2022-01-04 RX ORDER — CLONAZEPAM 0.5 MG/1
0.5 TABLET ORAL 2 TIMES DAILY PRN
Qty: 30 TABLET | Refills: 0 | Status: SHIPPED | OUTPATIENT
Start: 2022-01-04 | End: 2022-01-20 | Stop reason: SDUPTHER

## 2022-01-04 RX ORDER — ESCITALOPRAM OXALATE 5 MG/1
5 TABLET ORAL DAILY
Qty: 30 TABLET | Refills: 0 | Status: SHIPPED | OUTPATIENT
Start: 2022-01-04 | End: 2022-01-31

## 2022-01-04 NOTE — TELEPHONE ENCOUNTER
Caller: Arlette Mix    Relationship: Self    Best call back number: [phone] (H)    What is the best time to reach you: ANY TIME     Who are you requesting to speak with (clinical staff, provider,  specific staff member):  STAFF     What was the call regarding: PATIENT HAS A VIRTUAL VISIT TODAY AT 4 O CLOCK- SHE WAS ADVISED TO CALL INSURANCE AND ADD DR CASTELLON TO HER PLAN AS HER PCP PRIOR TO THIS APPOINTMENT. SHE HAS DONE THAT AND HAS PROVIDED A REFERENCE NUMBER FOR THIS:  REFERENCE NUMBER: [id number]

## 2022-01-04 NOTE — PROGRESS NOTES
Chief Complaint  Anxiety and Depression    You have chosen to receive care through a telehealth visit.  Do you consent to use a video/audio connection for your medical care today? Yes    Patient presents during the COVID-19 pandemic/federally declared state of public health emergency.  This service was conducted via [email or web address] real-time audio/visual. I am at my office and the patient is in her car.       Subjective          Arlette Mix presents to [facility]  Anxiety  Presents for follow-up visit. Symptoms include decreased concentration, depressed mood, irritability, nervous/anxious behavior and panic. Patient reports no suicidal ideas. Symptoms occur constantly. The severity of symptoms is causing significant distress. The quality of sleep is poor.     Her past medical history is significant for depression.   Depression  Visit Type: follow-up  Patient presents with the following symptoms: anhedonia, decreased concentration, depressed mood, fatigue, irritability, nervousness/anxiety and panic.  Patient is not experiencing: suicidal ideas.  Frequency of symptoms: constantly   Severity: causing significant distress         Objective   Vital Signs:   There were no vitals taken for this visit.    Physical Exam  Constitutional:       Appearance: Normal appearance.   Pulmonary:      Effort: Pulmonary effort is normal.   Neurological:      Mental Status: She is alert.   Psychiatric:         Attention and Perception: Attention normal.         Mood and Affect: Affect is tearful.         Speech: Speech normal.         Cognition and Memory: Cognition normal.        Result Review :     Common labs    Common Labsle 7/2/21 7/2/21    1707 1708   Glucose 103 (A)    BUN 10    Creatinine 0.76    eGFR Non African Am 93    Sodium 139    Potassium 3.5    Chloride 104    Calcium 8.5 (A)    WBC  6.30   Hemoglobin  13.6   Hematocrit  39.4   Platelets  187   (A) Abnormal value                       Assessment and Plan    Diagnoses and all orders for this visit:    1. Anxiety (Primary)  -     clonazePAM (KlonoPIN) 0.5 MG tablet; Take 1 tablet by mouth 2 (Two) Times a Day As Needed for Anxiety.  Dispense: 30 tablet; Refill: 0    2. Current moderate episode of major depressive disorder, unspecified whether recurrent (HCC)  -     escitalopram (Lexapro) 5 MG tablet; Take 1 tablet by mouth Daily.  Dispense: 30 tablet; Refill: 0        Follow Up   No follow-ups on file.  Patient was given instructions and counseling regarding her condition or for health maintenance advice. Please see specific information pulled into the AVS if appropriate.

## 2022-01-10 DIAGNOSIS — F90.0 ATTENTION DEFICIT HYPERACTIVITY DISORDER (ADHD), PREDOMINANTLY INATTENTIVE TYPE: ICD-10-CM

## 2022-01-10 DIAGNOSIS — Z79.899 HIGH RISK MEDICATION USE: ICD-10-CM

## 2022-01-10 RX ORDER — DEXTROAMPHETAMINE SACCHARATE, AMPHETAMINE ASPARTATE MONOHYDRATE, DEXTROAMPHETAMINE SULFATE AND AMPHETAMINE SULFATE 5; 5; 5; 5 MG/1; MG/1; MG/1; MG/1
40 CAPSULE, EXTENDED RELEASE ORAL EVERY MORNING
Qty: 60 CAPSULE | Refills: 0 | Status: SHIPPED | OUTPATIENT
Start: 2022-01-10 | End: 2022-01-31 | Stop reason: SDUPTHER

## 2022-01-10 NOTE — TELEPHONE ENCOUNTER
Caller: Arlette Mix    Relationship: Self    Best call back number: [phone]    Requested Prescriptions:   Requested Prescriptions     Pending Prescriptions Disp Refills   • amphetamine-dextroamphetamine XR (Adderall XR) 20 MG 24 hr capsule 60 capsule 0     Sig: Take 2 capsules by mouth Every Morning        Pharmacy where request should be sent: [id number] [address] [facility] - [phone]  - [phone] FX       Does the patient have less than a 3 day supply:  [x] Yes  [] No    Ector Sanchez Rep   01/10/22 13:36 EST

## 2022-01-18 ENCOUNTER — TELEPHONE (OUTPATIENT)
Dept: FAMILY MEDICINE CLINIC | Facility: CLINIC | Age: 27
End: 2022-01-18

## 2022-01-18 DIAGNOSIS — F41.9 ANXIETY: ICD-10-CM

## 2022-01-18 RX ORDER — CLONAZEPAM 0.5 MG/1
0.5 TABLET ORAL 2 TIMES DAILY PRN
Qty: 30 TABLET | Refills: 0 | Status: CANCELLED | OUTPATIENT
Start: 2022-01-18

## 2022-01-18 NOTE — TELEPHONE ENCOUNTER
Caller: Maria Del Rosario Arletteluda Lin    Relationship: Self    Best call back number:[phone]    Requested Prescriptions:   Requested Prescriptions     Pending Prescriptions Disp Refills   • clonazePAM (KlonoPIN) 0.5 MG tablet 30 tablet 0     Sig: Take 1 tablet by mouth 2 (Two) Times a Day As Needed for Anxiety.        Pharmacy where request should be sent: [facility] [place] [id number] [facility] - [phone] [facility] [phone] FX     Additional details provided by patient: 1 PILL LEFT    Does the patient have less than a 3 day supply:  [x] Yes  [] No    Ector Burdick Rep   01/18/22 16:05 EST

## 2022-01-18 NOTE — TELEPHONE ENCOUNTER
Caller: Arlette Mix    Relationship: Self    Best call back number: [phone]    Who are you requesting to speak with (clinical staff, provider,  specific staff member): NURSE    What was the call regarding: PATIENT WOULD LIKE LEXAPRO SWITCHED TO ANOTHER MEDICATION. IT IS CAUSING DIZZINESS AND LOWER SEX DRIVE     Do you require a callback: YES    [facility] - [place] [address] [facility] - [phone] [facility] [phone]   [phone]

## 2022-01-18 NOTE — TELEPHONE ENCOUNTER
Caller: Arlette Mix    Relationship: Self    Best call back number: [phone]    Who are you requesting to speak with (clinical staff, provider,  specific staff member): NURSE    What was the call regarding: PATIENT WOULD LIKE A PRIOR AUTH DONE ON ADDERALL. SHE PAID OUT OF POCKET THE LAST TIME SHE PICKED IT UP.     Do you require a callback: YES   [Dear  ___] : Dear  [unfilled], [Courtesy Letter:] : I had the pleasure of seeing your patient, [unfilled], in my office today. [Please see my note below.] : Please see my note below. [Sincerely,] : Sincerely, [FreeTextEntry3] : PERLA Abbott\par Certified Pediatric Nurse Practitioner \par Pediatric Neurology \par [facility]\par \par Jay Tinajero MD\par Chief, Pediatric Neurology\par Co-Director (Neurology), Pediatric Sleep Program\par [facility]\par Professor of Pediatrics & Neurology at [employer] [facility]\par \par

## 2022-01-20 ENCOUNTER — TELEPHONE (OUTPATIENT)
Dept: FAMILY MEDICINE CLINIC | Facility: CLINIC | Age: 27
End: 2022-01-20

## 2022-01-20 DIAGNOSIS — F41.9 ANXIETY: ICD-10-CM

## 2022-01-20 NOTE — TELEPHONE ENCOUNTER
Hub staff attempted to follow warm transfer process and was unsuccessful     Caller: Arlette Mix    Relationship to patient: Self    Best call back number: [phone] (M)    Patient is needing: PATIENT CALLED TO FIND OUT WHAT THE DENIAL REASON WAS ON THE MEDICATION clonazePAM (KlonoPIN) 0.5 MG tablet BECAUSE MYCHART SHOWED NO DENIAL REASON, WARM TRANSFER WAS UNSUCCESSFUL, PLEASE CALL PATIENT BACK ASAP

## 2022-01-21 RX ORDER — CLONAZEPAM 0.5 MG/1
0.5 TABLET ORAL 2 TIMES DAILY PRN
Qty: 30 TABLET | Refills: 0 | Status: SHIPPED | OUTPATIENT
Start: 2022-01-21 | End: 2022-01-31

## 2022-01-26 ENCOUNTER — TELEPHONE (OUTPATIENT)
Dept: FAMILY MEDICINE CLINIC | Facility: CLINIC | Age: 27
End: 2022-01-26

## 2022-01-26 NOTE — TELEPHONE ENCOUNTER
PATIENT CALLED IN REFERENCE TO PRE AUTH FOR   amphetamine-dextroamphetamine XR (Adderall XR) 20 MG 24 hr capsule    SHE HAD TO PAY OUT OF POCKET FOR HER LAST REFILL.    SHE STATES THE PHARMACY TOLD HER THIS NEEDED A PRE AUTH.     SEE NOTE 1/18/22    CALL BACK NUMBER [phone]    [facility] #220 - [place], [place] - [address] - [phone]  - [phone] FX  [phone]    SHE IS WANTING TO GET THIS STRAIGHTEN OUT BEFORE NEXT REFILL

## 2022-01-28 ENCOUNTER — TELEPHONE (OUTPATIENT)
Dept: FAMILY MEDICINE CLINIC | Facility: CLINIC | Age: 27
End: 2022-01-28

## 2022-01-28 NOTE — TELEPHONE ENCOUNTER
Caller: Arlette Mix    Relationship to patient: Self    Best call back number:[phone]      Patient is needing: PATIENT IS NEEDING A PRIOR AUTHORIZATION FOR amphetamine-dextroamphetamine XR (Adderall XR) 20 MG 24 hr capsule

## 2022-01-31 ENCOUNTER — TELEMEDICINE (OUTPATIENT)
Dept: FAMILY MEDICINE CLINIC | Facility: CLINIC | Age: 27
End: 2022-01-31

## 2022-01-31 DIAGNOSIS — F32.1 CURRENT MODERATE EPISODE OF MAJOR DEPRESSIVE DISORDER, UNSPECIFIED WHETHER RECURRENT: ICD-10-CM

## 2022-01-31 DIAGNOSIS — F90.0 ATTENTION DEFICIT HYPERACTIVITY DISORDER (ADHD), PREDOMINANTLY INATTENTIVE TYPE: ICD-10-CM

## 2022-01-31 DIAGNOSIS — Z79.899 HIGH RISK MEDICATION USE: ICD-10-CM

## 2022-01-31 DIAGNOSIS — F41.9 ANXIETY: Primary | ICD-10-CM

## 2022-01-31 PROCEDURE — 99214 OFFICE O/P EST MOD 30 MIN: CPT | Performed by: FAMILY MEDICINE

## 2022-01-31 RX ORDER — BUPROPION HYDROCHLORIDE 150 MG/1
150 TABLET ORAL DAILY
Qty: 90 TABLET | Refills: 0 | Status: SHIPPED | OUTPATIENT
Start: 2022-01-31 | End: 2022-05-16 | Stop reason: SDUPTHER

## 2022-01-31 RX ORDER — CLONAZEPAM 1 MG/1
1 TABLET ORAL DAILY PRN
Qty: 30 TABLET | Refills: 0 | Status: SHIPPED | OUTPATIENT
Start: 2022-01-31 | End: 2022-03-02 | Stop reason: SDUPTHER

## 2022-01-31 RX ORDER — DEXTROAMPHETAMINE SACCHARATE, AMPHETAMINE ASPARTATE MONOHYDRATE, DEXTROAMPHETAMINE SULFATE AND AMPHETAMINE SULFATE 5; 5; 5; 5 MG/1; MG/1; MG/1; MG/1
40 CAPSULE, EXTENDED RELEASE ORAL EVERY MORNING
Qty: 60 CAPSULE | Refills: 0 | Status: SHIPPED | OUTPATIENT
Start: 2022-01-31 | End: 2022-03-07 | Stop reason: SDUPTHER

## 2022-01-31 NOTE — TELEPHONE ENCOUNTER
Have you heard anything back on her PA for Adderall?  She says the last time she got it at the pharmacy she had to pay cash for it.

## 2022-01-31 NOTE — PROGRESS NOTES
Subjective   Chief Complaint   Patient presents with   • Depression     side effects of medicine     Arlette Mix is a 26 y.o. female.     You have chosen to receive care through a telehealth visit.  Do you consent to use a video/audio connection for your medical care today? Yes    Patient presents during the COVID-19 pandemic/federally declared [facility] public health emergency.  This service was conducted via [employer] real-time audio/visual. I am at my office and the patient is at home.    She reports that Lexapro hs giving her side effects of dizziness and sexual side effects (anorgasmia) and she would like to try a different medicine.  She is planning to start counseling soon.     Depression  Visit Type: follow-up  Patient presents with the following symptoms: anhedonia, depressed mood, nervousness/anxiety, panic and psychomotor retardation.  Patient is not experiencing: suicidal ideas and thoughts of death.  Frequency of symptoms: constantly   Severity: causing significant distress   Sleep quality: fair      Anxiety  Presents for follow-up visit. Symptoms include depressed mood, nervous/anxious behavior and panic. Patient reports no suicidal ideas. Symptoms occur most days. The severity of symptoms is causing significant distress. The quality of sleep is fair.     Her past medical history is significant for depression. Compliance with medications is %.      Past Medical History:   Diagnosis Date   • Anxiety    • Right radial head fracture 2019     Past Surgical History:   Procedure Laterality Date   •  SECTION     • CHOLECYSTECTOMY       No Known Allergies  Social History     Socioeconomic History   • Marital status: Single   Tobacco Use   • Smoking status: Never Smoker   • Smokeless tobacco: Never Used   Substance and Sexual Activity   • Alcohol use: Yes     Comment: caffeine 1c qd   • Drug use: Never   • Sexual activity: Defer     Social History     Tobacco Use   Smoking Status Never  Smoker   Smokeless Tobacco Never Used       family history includes Migraines in her mother.  Current Outpatient Medications on File Prior to Visit   Medication Sig Dispense Refill   • clindamycin (Cleocin-T) 1 % external solution Apply  topically to the appropriate area as directed 2 (Two) Times a Day. 60 mL 5   • [DISCONTINUED] amphetamine-dextroamphetamine XR (Adderall XR) 20 MG 24 hr capsule Take 2 capsules by mouth Every Morning 60 capsule 0   • [DISCONTINUED] clonazePAM (KlonoPIN) 0.5 MG tablet Take 1 tablet by mouth 2 (Two) Times a Day As Needed for Anxiety. 30 tablet 0   • [DISCONTINUED] escitalopram (Lexapro) 5 MG tablet Take 1 tablet by mouth Daily. 30 tablet 0     No current facility-administered medications on file prior to visit.     Patient Active Problem List   Diagnosis   • Asthma   • Attention-deficit hyperactivity disorder   • Depression   • Encounter for general adult medical examination without abnormal findings   • Anxiety   • Closed fracture of lower end of right radius with routine healing   • Axillary hyperhidrosis   • Sebaceous cyst   • Keratosis pilaris   • High risk medication use   • Vaginal discharge   • STD exposure       The following portions of the patient's history were reviewed and updated as appropriate: allergies, current medications, past family history, past medical history, past social history, past surgical history and problem list.    Review of Systems   Psychiatric/Behavioral: Positive for depressed mood. Negative for suicidal ideas. The patient is nervous/anxious.        Objective   There were no vitals taken for this visit.  Physical Exam  Constitutional:       Appearance: Normal appearance.   Pulmonary:      Effort: Pulmonary effort is normal.   Neurological:      Mental Status: She is alert.   Psychiatric:         Mood and Affect: Mood normal.         No visits with results within 1 Week(s) from this visit.   Latest known visit with results is:   Office Visit on  12/01/2021   Component Date Value Ref Range Status   • Chlamydia DNA by PCR 12/01/2021 Detected* Not Detected, Invalid Final   • Neisseria gonorrhoeae by PCR 12/01/2021 Not Detected  Not Detected Final   • Hepatitis C Ab 12/01/2021 Non-Reactive  Non-Reactive Final   • T pallidum Antibodies (TP-PA) 12/01/2021 Non Reactive  Non Reactive Final           Assessment/Plan   Diagnoses and all orders for this visit:    1. Anxiety (Primary)  -     clonazePAM (KlonoPIN) 1 MG tablet; Take 1 tablet by mouth Daily As Needed for Anxiety.  Dispense: 30 tablet; Refill: 0    2. Attention deficit hyperactivity disorder (ADHD), predominantly inattentive type  -     amphetamine-dextroamphetamine XR (Adderall XR) 20 MG 24 hr capsule; Take 2 capsules by mouth Every Morning  Dispense: 60 capsule; Refill: 0    3. Current moderate episode of major depressive disorder, unspecified whether recurrent (HCC)  -     buPROPion XL (Wellbutrin XL) 150 MG 24 hr tablet; Take 1 tablet by mouth Daily.  Dispense: 90 tablet; Refill: 0    4. High risk medication use  -     amphetamine-dextroamphetamine XR (Adderall XR) 20 MG 24 hr capsule; Take 2 capsules by mouth Every Morning  Dispense: 60 capsule; Refill: 0

## 2022-02-22 ENCOUNTER — OFFICE VISIT (OUTPATIENT)
Dept: FAMILY MEDICINE CLINIC | Facility: CLINIC | Age: 27
End: 2022-02-22

## 2022-02-22 ENCOUNTER — LAB (OUTPATIENT)
Dept: LAB | Facility: HOSPITAL | Age: 27
End: 2022-02-22

## 2022-02-22 VITALS
OXYGEN SATURATION: 97 % | WEIGHT: 152 LBS | HEART RATE: 120 BPM | BODY MASS INDEX: 25.29 KG/M2 | DIASTOLIC BLOOD PRESSURE: 91 MMHG | SYSTOLIC BLOOD PRESSURE: 127 MMHG | TEMPERATURE: 98.2 F

## 2022-02-22 DIAGNOSIS — Z20.2 STD EXPOSURE: Primary | ICD-10-CM

## 2022-02-22 DIAGNOSIS — Z20.2 STD EXPOSURE: ICD-10-CM

## 2022-02-22 LAB
C TRACH RRNA CVX QL NAA+PROBE: NOT DETECTED
HAV IGM SERPL QL IA: NORMAL
HBV CORE IGM SERPL QL IA: NORMAL
HBV SURFACE AG SERPL QL IA: NORMAL
HCV AB SER DONR QL: NORMAL
HIV1+2 AB SER QL: NORMAL
N GONORRHOEA RRNA SPEC QL NAA+PROBE: NOT DETECTED
RPR SER QL: NORMAL

## 2022-02-22 PROCEDURE — 87591 N.GONORRHOEAE DNA AMP PROB: CPT | Performed by: FAMILY MEDICINE

## 2022-02-22 PROCEDURE — 86696 HERPES SIMPLEX TYPE 2 TEST: CPT

## 2022-02-22 PROCEDURE — G0432 EIA HIV-1/HIV-2 SCREEN: HCPCS

## 2022-02-22 PROCEDURE — 80074 ACUTE HEPATITIS PANEL: CPT

## 2022-02-22 PROCEDURE — 87491 CHLMYD TRACH DNA AMP PROBE: CPT | Performed by: FAMILY MEDICINE

## 2022-02-22 PROCEDURE — 99213 OFFICE O/P EST LOW 20 MIN: CPT | Performed by: FAMILY MEDICINE

## 2022-02-22 PROCEDURE — 36415 COLL VENOUS BLD VENIPUNCTURE: CPT

## 2022-02-22 PROCEDURE — 86592 SYPHILIS TEST NON-TREP QUAL: CPT

## 2022-02-22 PROCEDURE — 86695 HERPES SIMPLEX TYPE 1 TEST: CPT

## 2022-02-23 LAB
HSV1 IGG SER IA-ACNC: <0.91 INDEX (ref 0–0.9)
HSV2 IGG SER IA-ACNC: <0.91 INDEX (ref 0–0.9)

## 2022-03-02 DIAGNOSIS — F41.9 ANXIETY: ICD-10-CM

## 2022-03-06 RX ORDER — CLONAZEPAM 1 MG/1
1 TABLET ORAL DAILY PRN
Qty: 30 TABLET | Refills: 0 | Status: SHIPPED | OUTPATIENT
Start: 2022-03-06 | End: 2022-03-30 | Stop reason: SDUPTHER

## 2022-03-07 DIAGNOSIS — F90.0 ATTENTION DEFICIT HYPERACTIVITY DISORDER (ADHD), PREDOMINANTLY INATTENTIVE TYPE: ICD-10-CM

## 2022-03-07 DIAGNOSIS — Z79.899 HIGH RISK MEDICATION USE: ICD-10-CM

## 2022-03-07 RX ORDER — DEXTROAMPHETAMINE SACCHARATE, AMPHETAMINE ASPARTATE MONOHYDRATE, DEXTROAMPHETAMINE SULFATE AND AMPHETAMINE SULFATE 5; 5; 5; 5 MG/1; MG/1; MG/1; MG/1
40 CAPSULE, EXTENDED RELEASE ORAL EVERY MORNING
Qty: 60 CAPSULE | Refills: 0 | Status: SHIPPED | OUTPATIENT
Start: 2022-03-07 | End: 2022-04-05 | Stop reason: SDUPTHER

## 2022-03-14 ENCOUNTER — TELEPHONE (OUTPATIENT)
Dept: FAMILY MEDICINE CLINIC | Facility: CLINIC | Age: 27
End: 2022-03-14

## 2022-03-14 NOTE — TELEPHONE ENCOUNTER
PATIENT CALLING TO SCHEDULE A MYCHART APPOINTMENT FOR HER AND HER DAUGHTER WHO IS ALSO A PATIENT OF DR. CASTELLON.     DR. CASTELLON DID NOT HAVE BACK TO BACK APPOINTMENTS PLEASE CALL TO SCHEDULE. FAIL TO WARM TRANSFER.      SYMPTOMS: RED SPOTS ON LEFT, 2 ON STOMACH, 1 ON UPPER BACK    CALL BACK NUMBER : [phone]

## 2022-03-16 ENCOUNTER — TELEMEDICINE (OUTPATIENT)
Dept: FAMILY MEDICINE CLINIC | Facility: CLINIC | Age: 27
End: 2022-03-16

## 2022-03-16 DIAGNOSIS — B35.4 TINEA CORPORIS: Primary | ICD-10-CM

## 2022-03-16 PROCEDURE — 99213 OFFICE O/P EST LOW 20 MIN: CPT | Performed by: FAMILY MEDICINE

## 2022-03-16 RX ORDER — FLUCONAZOLE 100 MG/1
100 TABLET ORAL DAILY
Qty: 5 TABLET | Refills: 0 | Status: SHIPPED | OUTPATIENT
Start: 2022-03-16

## 2022-03-16 RX ORDER — NYSTATIN AND TRIAMCINOLONE ACETONIDE 100000; 1 [USP'U]/G; MG/G
1 OINTMENT TOPICAL 2 TIMES DAILY
Qty: 30 G | Refills: 1 | Status: SHIPPED | OUTPATIENT
Start: 2022-03-16

## 2022-03-16 NOTE — PROGRESS NOTES
Chief Complaint  Rash    You have chosen to receive care through a telehealth visit.  Do you consent to use a video/audio connection for your medical care today? Yes    Patient presents during the COVID-19 pandemic/federally declared state of public health emergency.  This service was conducted via [employer] real-time audio/visual.      Subjective          Arlette Mix presents to [facility]  Her 3 year old daughter has a similar rash.    Rash  This is a new problem. The current episode started 1 to 4 weeks ago. The problem is unchanged. The affected locations include the abdomen. The rash is characterized by redness and itchiness. She was exposed to nothing. Past treatments include nothing. The treatment provided no relief.       Objective   Vital Signs:   There were no vitals taken for this visit.    Physical Exam  Constitutional:       Appearance: Normal appearance.   Pulmonary:      Effort: Pulmonary effort is normal.   Skin:     Findings: Rash present. Rash is macular.      Comments: Pink macular rash on abdomen   Neurological:      General: No focal deficit present.      Mental Status: She is alert.        Result Review :   The following data was reviewed by: Rosie Manuel MD on 03/16/2022:  Common labs    Common Labsle 7/2/21 7/2/21    1707 1708   Glucose 103 (A)    BUN 10    Creatinine 0.76    eGFR Non African Am 93    Sodium 139    Potassium 3.5    Chloride 104    Calcium 8.5 (A)    WBC  6.30   Hemoglobin  13.6   Hematocrit  39.4   Platelets  187   (A) Abnormal value                      Assessment and Plan    Diagnoses and all orders for this visit:    1. Tinea corporis (Primary)  -     nystatin-triamcinolone (MYCOLOG) 229447-8.1 UNIT/GM-% ointment; Apply 1 application topically to the appropriate area as directed 2 (Two) Times a Day.  Dispense: 30 g; Refill: 1  -     fluconazole (Diflucan) 100 MG tablet; Take 1 tablet by mouth Daily.  Dispense: 5 tablet; Refill:  0        Follow Up   No follow-ups on file.  Patient was given instructions and counseling regarding her condition or for health maintenance advice. Please see specific information pulled into the AVS if appropriate.

## 2022-03-30 DIAGNOSIS — F41.9 ANXIETY: ICD-10-CM

## 2022-03-31 RX ORDER — CLONAZEPAM 1 MG/1
1 TABLET ORAL DAILY PRN
Qty: 30 TABLET | Refills: 0 | Status: SHIPPED | OUTPATIENT
Start: 2022-03-31 | End: 2022-05-02 | Stop reason: SDUPTHER

## 2022-04-05 DIAGNOSIS — F90.0 ATTENTION DEFICIT HYPERACTIVITY DISORDER (ADHD), PREDOMINANTLY INATTENTIVE TYPE: ICD-10-CM

## 2022-04-05 DIAGNOSIS — Z79.899 HIGH RISK MEDICATION USE: ICD-10-CM

## 2022-04-05 RX ORDER — DEXTROAMPHETAMINE SACCHARATE, AMPHETAMINE ASPARTATE MONOHYDRATE, DEXTROAMPHETAMINE SULFATE AND AMPHETAMINE SULFATE 5; 5; 5; 5 MG/1; MG/1; MG/1; MG/1
40 CAPSULE, EXTENDED RELEASE ORAL EVERY MORNING
Qty: 60 CAPSULE | Refills: 0 | Status: SHIPPED | OUTPATIENT
Start: 2022-04-05 | End: 2022-05-02 | Stop reason: SDUPTHER

## 2022-05-02 DIAGNOSIS — Z79.899 HIGH RISK MEDICATION USE: ICD-10-CM

## 2022-05-02 DIAGNOSIS — F41.9 ANXIETY: ICD-10-CM

## 2022-05-02 DIAGNOSIS — F90.0 ATTENTION DEFICIT HYPERACTIVITY DISORDER (ADHD), PREDOMINANTLY INATTENTIVE TYPE: ICD-10-CM

## 2022-05-03 RX ORDER — CLONAZEPAM 1 MG/1
1 TABLET ORAL DAILY PRN
Qty: 30 TABLET | Refills: 0 | Status: SHIPPED | OUTPATIENT
Start: 2022-05-03 | End: 2022-06-01 | Stop reason: SDUPTHER

## 2022-05-03 RX ORDER — DEXTROAMPHETAMINE SACCHARATE, AMPHETAMINE ASPARTATE MONOHYDRATE, DEXTROAMPHETAMINE SULFATE AND AMPHETAMINE SULFATE 5; 5; 5; 5 MG/1; MG/1; MG/1; MG/1
40 CAPSULE, EXTENDED RELEASE ORAL EVERY MORNING
Qty: 60 CAPSULE | Refills: 0 | Status: SHIPPED | OUTPATIENT
Start: 2022-05-03 | End: 2022-06-01 | Stop reason: SDUPTHER

## 2022-05-16 DIAGNOSIS — F32.1 CURRENT MODERATE EPISODE OF MAJOR DEPRESSIVE DISORDER, UNSPECIFIED WHETHER RECURRENT: ICD-10-CM

## 2022-05-16 RX ORDER — BUPROPION HYDROCHLORIDE 150 MG/1
150 TABLET ORAL DAILY
Qty: 90 TABLET | Refills: 0 | Status: SHIPPED | OUTPATIENT
Start: 2022-05-16 | End: 2022-09-21

## 2022-06-01 DIAGNOSIS — F41.9 ANXIETY: ICD-10-CM

## 2022-06-01 DIAGNOSIS — F90.0 ATTENTION DEFICIT HYPERACTIVITY DISORDER (ADHD), PREDOMINANTLY INATTENTIVE TYPE: ICD-10-CM

## 2022-06-01 DIAGNOSIS — Z79.899 HIGH RISK MEDICATION USE: ICD-10-CM

## 2022-06-01 RX ORDER — CLONAZEPAM 1 MG/1
1 TABLET ORAL DAILY PRN
Qty: 30 TABLET | Refills: 0 | Status: SHIPPED | OUTPATIENT
Start: 2022-06-01 | End: 2022-06-27 | Stop reason: SDUPTHER

## 2022-06-01 RX ORDER — DEXTROAMPHETAMINE SACCHARATE, AMPHETAMINE ASPARTATE MONOHYDRATE, DEXTROAMPHETAMINE SULFATE AND AMPHETAMINE SULFATE 5; 5; 5; 5 MG/1; MG/1; MG/1; MG/1
40 CAPSULE, EXTENDED RELEASE ORAL EVERY MORNING
Qty: 60 CAPSULE | Refills: 0 | Status: SHIPPED | OUTPATIENT
Start: 2022-06-01 | End: 2022-06-27 | Stop reason: SDUPTHER

## 2022-06-27 DIAGNOSIS — F90.0 ATTENTION DEFICIT HYPERACTIVITY DISORDER (ADHD), PREDOMINANTLY INATTENTIVE TYPE: ICD-10-CM

## 2022-06-27 DIAGNOSIS — F41.9 ANXIETY: ICD-10-CM

## 2022-06-27 DIAGNOSIS — Z79.899 HIGH RISK MEDICATION USE: ICD-10-CM

## 2022-06-27 RX ORDER — CLONAZEPAM 1 MG/1
1 TABLET ORAL DAILY PRN
Qty: 30 TABLET | Refills: 0 | Status: SHIPPED | OUTPATIENT
Start: 2022-06-27 | End: 2022-07-25 | Stop reason: SDUPTHER

## 2022-06-27 RX ORDER — DEXTROAMPHETAMINE SACCHARATE, AMPHETAMINE ASPARTATE MONOHYDRATE, DEXTROAMPHETAMINE SULFATE AND AMPHETAMINE SULFATE 5; 5; 5; 5 MG/1; MG/1; MG/1; MG/1
40 CAPSULE, EXTENDED RELEASE ORAL EVERY MORNING
Qty: 60 CAPSULE | Refills: 0 | Status: SHIPPED | OUTPATIENT
Start: 2022-06-27 | End: 2022-07-25 | Stop reason: SDUPTHER

## 2022-07-25 DIAGNOSIS — F90.0 ATTENTION DEFICIT HYPERACTIVITY DISORDER (ADHD), PREDOMINANTLY INATTENTIVE TYPE: ICD-10-CM

## 2022-07-25 DIAGNOSIS — Z79.899 HIGH RISK MEDICATION USE: ICD-10-CM

## 2022-07-25 DIAGNOSIS — F41.9 ANXIETY: ICD-10-CM

## 2022-07-26 RX ORDER — CLONAZEPAM 1 MG/1
1 TABLET ORAL DAILY PRN
Qty: 30 TABLET | Refills: 0 | Status: SHIPPED | OUTPATIENT
Start: 2022-07-26 | End: 2022-08-24

## 2022-07-26 RX ORDER — DEXTROAMPHETAMINE SACCHARATE, AMPHETAMINE ASPARTATE MONOHYDRATE, DEXTROAMPHETAMINE SULFATE AND AMPHETAMINE SULFATE 5; 5; 5; 5 MG/1; MG/1; MG/1; MG/1
40 CAPSULE, EXTENDED RELEASE ORAL EVERY MORNING
Qty: 60 CAPSULE | Refills: 0 | Status: SHIPPED | OUTPATIENT
Start: 2022-07-26 | End: 2022-08-24 | Stop reason: SDUPTHER

## 2022-08-24 DIAGNOSIS — Z79.899 HIGH RISK MEDICATION USE: ICD-10-CM

## 2022-08-24 DIAGNOSIS — F41.9 ANXIETY: ICD-10-CM

## 2022-08-24 DIAGNOSIS — F90.0 ATTENTION DEFICIT HYPERACTIVITY DISORDER (ADHD), PREDOMINANTLY INATTENTIVE TYPE: ICD-10-CM

## 2022-08-24 RX ORDER — DEXTROAMPHETAMINE SACCHARATE, AMPHETAMINE ASPARTATE MONOHYDRATE, DEXTROAMPHETAMINE SULFATE AND AMPHETAMINE SULFATE 5; 5; 5; 5 MG/1; MG/1; MG/1; MG/1
40 CAPSULE, EXTENDED RELEASE ORAL EVERY MORNING
Qty: 60 CAPSULE | Refills: 0 | Status: SHIPPED | OUTPATIENT
Start: 2022-08-24 | End: 2022-09-21 | Stop reason: SDUPTHER

## 2022-08-24 RX ORDER — CLONAZEPAM 1 MG/1
1 TABLET ORAL DAILY PRN
Qty: 30 TABLET | Refills: 0 | Status: SHIPPED | OUTPATIENT
Start: 2022-08-24 | End: 2022-09-21 | Stop reason: SDUPTHER

## 2022-09-19 ENCOUNTER — HOSPITAL ENCOUNTER (OUTPATIENT)
Facility: HOSPITAL | Age: 27
Discharge: HOME | End: 2022-09-19
Attending: EMERGENCY MEDICINE | Admitting: EMERGENCY MEDICINE

## 2022-09-19 VITALS
BODY MASS INDEX: 25.61 KG/M2 | DIASTOLIC BLOOD PRESSURE: 108 MMHG | WEIGHT: 150 LBS | TEMPERATURE: 98.4 F | HEART RATE: 98 BPM | SYSTOLIC BLOOD PRESSURE: 141 MMHG | RESPIRATION RATE: 16 BRPM | OXYGEN SATURATION: 97 % | HEIGHT: 64 IN

## 2022-09-19 DIAGNOSIS — J06.9 VIRAL URI: Primary | ICD-10-CM

## 2022-09-19 LAB
FLUAV SUBTYP SPEC NAA+PROBE: NOT DETECTED
FLUBV RNA ISLT QL NAA+PROBE: NOT DETECTED
SARS-COV-2 RNA RESP QL NAA+PROBE: NOT DETECTED
STREP A PCR: NOT DETECTED

## 2022-09-19 PROCEDURE — 99202 OFFICE O/P NEW SF 15 MIN: CPT | Performed by: EMERGENCY MEDICINE

## 2022-09-19 PROCEDURE — 87651 STREP A DNA AMP PROBE: CPT | Performed by: EMERGENCY MEDICINE

## 2022-09-19 PROCEDURE — 87636 SARSCOV2 & INF A&B AMP PRB: CPT | Performed by: EMERGENCY MEDICINE

## 2022-09-19 PROCEDURE — G0463 HOSPITAL OUTPT CLINIC VISIT: HCPCS | Performed by: EMERGENCY MEDICINE

## 2022-09-19 NOTE — DISCHARGE INSTRUCTIONS
Vitamin C vitamin D each day by their own tablets not a combination.  Take zinc every day as well this will help fight the virus.  Drink plenty fluids get plenty of rest.

## 2022-09-19 NOTE — ED PROVIDER NOTES
Subjective   History of Present Illness  Patient is admitting to body aches sore throat cough congestion.        Review of Systems   HENT: Positive for congestion, rhinorrhea and sore throat.    Respiratory: Positive for cough.    All other systems reviewed and are negative.      Past Medical History:   Diagnosis Date   • Anxiety    • Right radial head fracture 2019       No Known Allergies    Past Surgical History:   Procedure Laterality Date   •  SECTION     • CHOLECYSTECTOMY  2019       Family History   Problem Relation Age of Onset   • Migraines Mother        Social History     Socioeconomic History   • Marital status: Single   Tobacco Use   • Smoking status: Never Smoker   • Smokeless tobacco: Never Used   Substance and Sexual Activity   • Alcohol use: Yes     Comment: caffeine 1c qd   • Drug use: Never   • Sexual activity: Defer           Objective   Physical Exam  Vitals and nursing note reviewed.   Constitutional:       General: She is not in acute distress.     Appearance: She is well-developed. She is not ill-appearing, toxic-appearing or diaphoretic.   HENT:      Head: Normocephalic and atraumatic.      Nose: Congestion present. No rhinorrhea.      Mouth/Throat:      Mouth: Mucous membranes are moist.      Tonsils: Tonsillar exudate present. No tonsillar abscesses. 1+ on the right. 1+ on the left.   Eyes:      Conjunctiva/sclera: Conjunctivae normal.      Pupils: Pupils are equal, round, and reactive to light.   Cardiovascular:      Rate and Rhythm: Normal rate and regular rhythm.   Pulmonary:      Effort: Pulmonary effort is normal. No respiratory distress.      Breath sounds: No wheezing.   Abdominal:      General: There is no distension.   Musculoskeletal:      Cervical back: Normal range of motion and neck supple.   Lymphadenopathy:      Cervical: Cervical adenopathy present.   Skin:     General: Skin is warm and dry.      Capillary Refill: Capillary refill takes less than 2 seconds.    Neurological:      Mental Status: She is alert.         Procedures           ED Course                                           MDM  Number of Diagnoses or Management Options  Diagnosis management comments: Patient's COVID strep and influenza RSV are all negative's.  The patient is going to be released      Final diagnoses:   Viral URI       ED Disposition  ED Disposition     ED Disposition   Discharge    Condition   Stable    Comment   --             Rosie Manuel MD  [address] [facility] [address]  [place] [address]  [phone]    In 1 week  As needed         Medication List      No changes were made to your prescriptions during this visit.          Blayne Dupree MD  09/19/22 5030

## 2022-09-21 DIAGNOSIS — F90.0 ATTENTION DEFICIT HYPERACTIVITY DISORDER (ADHD), PREDOMINANTLY INATTENTIVE TYPE: ICD-10-CM

## 2022-09-21 DIAGNOSIS — Z79.899 HIGH RISK MEDICATION USE: ICD-10-CM

## 2022-09-21 DIAGNOSIS — F32.1 CURRENT MODERATE EPISODE OF MAJOR DEPRESSIVE DISORDER, UNSPECIFIED WHETHER RECURRENT: ICD-10-CM

## 2022-09-21 DIAGNOSIS — F41.9 ANXIETY: ICD-10-CM

## 2022-09-21 RX ORDER — BUPROPION HYDROCHLORIDE 150 MG/1
TABLET ORAL
Qty: 90 TABLET | Refills: 0 | Status: SHIPPED | OUTPATIENT
Start: 2022-09-21 | End: 2023-01-31 | Stop reason: SDUPTHER

## 2022-09-22 RX ORDER — DEXTROAMPHETAMINE SACCHARATE, AMPHETAMINE ASPARTATE MONOHYDRATE, DEXTROAMPHETAMINE SULFATE AND AMPHETAMINE SULFATE 5; 5; 5; 5 MG/1; MG/1; MG/1; MG/1
40 CAPSULE, EXTENDED RELEASE ORAL EVERY MORNING
Qty: 60 CAPSULE | Refills: 0 | Status: SHIPPED | OUTPATIENT
Start: 2022-09-22 | End: 2022-10-24 | Stop reason: SDUPTHER

## 2022-09-22 RX ORDER — CLONAZEPAM 1 MG/1
1 TABLET ORAL DAILY PRN
Qty: 30 TABLET | Refills: 0 | Status: SHIPPED | OUTPATIENT
Start: 2022-09-22 | End: 2022-10-24 | Stop reason: SDUPTHER

## 2022-10-24 DIAGNOSIS — F90.0 ATTENTION DEFICIT HYPERACTIVITY DISORDER (ADHD), PREDOMINANTLY INATTENTIVE TYPE: ICD-10-CM

## 2022-10-24 DIAGNOSIS — F41.9 ANXIETY: ICD-10-CM

## 2022-10-24 DIAGNOSIS — Z79.899 HIGH RISK MEDICATION USE: ICD-10-CM

## 2022-10-24 RX ORDER — CLONAZEPAM 1 MG/1
1 TABLET ORAL DAILY PRN
Qty: 30 TABLET | Refills: 0 | Status: SHIPPED | OUTPATIENT
Start: 2022-10-24 | End: 2022-11-21 | Stop reason: SDUPTHER

## 2022-10-24 RX ORDER — DEXTROAMPHETAMINE SACCHARATE, AMPHETAMINE ASPARTATE MONOHYDRATE, DEXTROAMPHETAMINE SULFATE AND AMPHETAMINE SULFATE 5; 5; 5; 5 MG/1; MG/1; MG/1; MG/1
40 CAPSULE, EXTENDED RELEASE ORAL EVERY MORNING
Qty: 60 CAPSULE | Refills: 0 | Status: SHIPPED | OUTPATIENT
Start: 2022-10-24 | End: 2022-11-21 | Stop reason: SDUPTHER

## 2022-11-21 DIAGNOSIS — Z79.899 HIGH RISK MEDICATION USE: ICD-10-CM

## 2022-11-21 DIAGNOSIS — F90.0 ATTENTION DEFICIT HYPERACTIVITY DISORDER (ADHD), PREDOMINANTLY INATTENTIVE TYPE: ICD-10-CM

## 2022-11-21 DIAGNOSIS — F41.9 ANXIETY: ICD-10-CM

## 2022-11-21 RX ORDER — DEXTROAMPHETAMINE SACCHARATE, AMPHETAMINE ASPARTATE MONOHYDRATE, DEXTROAMPHETAMINE SULFATE AND AMPHETAMINE SULFATE 5; 5; 5; 5 MG/1; MG/1; MG/1; MG/1
40 CAPSULE, EXTENDED RELEASE ORAL EVERY MORNING
Qty: 60 CAPSULE | Refills: 0 | Status: SHIPPED | OUTPATIENT
Start: 2022-11-21 | End: 2022-12-20 | Stop reason: SDUPTHER

## 2022-11-21 RX ORDER — CLONAZEPAM 1 MG/1
1 TABLET ORAL DAILY PRN
Qty: 30 TABLET | Refills: 0 | Status: SHIPPED | OUTPATIENT
Start: 2022-11-21 | End: 2022-12-20 | Stop reason: SDUPTHER

## 2022-12-12 ENCOUNTER — HOSPITAL ENCOUNTER (OUTPATIENT)
Facility: HOSPITAL | Age: 27
Discharge: HOME | End: 2022-12-12
Attending: EMERGENCY MEDICINE | Admitting: EMERGENCY MEDICINE

## 2022-12-12 VITALS
OXYGEN SATURATION: 97 % | SYSTOLIC BLOOD PRESSURE: 145 MMHG | BODY MASS INDEX: 25.61 KG/M2 | HEIGHT: 64 IN | DIASTOLIC BLOOD PRESSURE: 98 MMHG | WEIGHT: 150 LBS | HEART RATE: 109 BPM | RESPIRATION RATE: 16 BRPM | TEMPERATURE: 98.1 F

## 2022-12-12 DIAGNOSIS — J10.1 INFLUENZA A: Primary | ICD-10-CM

## 2022-12-12 LAB
FLUAV SUBTYP SPEC NAA+PROBE: DETECTED
FLUBV RNA ISLT QL NAA+PROBE: NOT DETECTED
SARS-COV-2 RNA RESP QL NAA+PROBE: NOT DETECTED

## 2022-12-12 PROCEDURE — G0463 HOSPITAL OUTPT CLINIC VISIT: HCPCS | Performed by: NURSE PRACTITIONER

## 2022-12-12 PROCEDURE — 99213 OFFICE O/P EST LOW 20 MIN: CPT | Performed by: NURSE PRACTITIONER

## 2022-12-12 PROCEDURE — 87636 SARSCOV2 & INF A&B AMP PRB: CPT

## 2022-12-12 RX ORDER — ONDANSETRON 4 MG/1
4 TABLET, FILM COATED ORAL EVERY 8 HOURS PRN
Qty: 10 TABLET | Refills: 0 | Status: SHIPPED | OUTPATIENT
Start: 2022-12-12

## 2022-12-12 NOTE — FSED PROVIDER NOTE
EMERGENCY DEPARTMENT ENCOUNTER    Room Number:    Date seen:  2022  Time seen: 14:46 EST  PCP: Rosie Manuel MD  Historian: pt    HPI:  Chief complaint:flu like symptoms  A complete HPI/ROS/PMH/PSH/SH/FH are unobtainable due to: n/a  Context:Arlette Mix is a 27 y.o. female with h/o asthma who presents to the ED with c/o 3-4 days of flu like symptoms including body aches, headache, cough and an episode of n/v.  All symptoms rated as mild and not made better or worse by anything. She reports her daughter has been ill with similar symptoms for the past week.  She denies any shortness of breath or chest pain.    MEDICAL RECORD REVIEW    ALLERGIES  Patient has no known allergies.    PAST MEDICAL HISTORY  Active Ambulatory Problems     Diagnosis Date Noted   • Asthma 2019   • Attention-deficit hyperactivity disorder 2013   • Depression 2015   • Encounter for general adult medical examination without abnormal findings 2012   • Anxiety 2019   • Closed fracture of lower end of right radius with routine healing 2019   • Axillary hyperhidrosis 2020   • Sebaceous cyst 2020   • Keratosis pilaris 2020   • High risk medication use 2020   • Vaginal discharge 2021   • STD exposure 2021   • Tinea corporis 2022     Resolved Ambulatory Problems     Diagnosis Date Noted   • Acute URI 2020     Past Medical History:   Diagnosis Date   • Right radial head fracture 2019       PAST SURGICAL HISTORY  Past Surgical History:   Procedure Laterality Date   •  SECTION     • CHOLECYSTECTOMY  2019       FAMILY HISTORY  Family History   Problem Relation Age of Onset   • Migraines Mother        SOCIAL HISTORY  Social History     Socioeconomic History   • Marital status: Single   Tobacco Use   • Smoking status: Never   • Smokeless tobacco: Never   Substance and Sexual Activity   • Alcohol use: Yes     Comment: caffeine 1c qd   • Drug  use: Never   • Sexual activity: Defer       REVIEW OF SYSTEMS  Review of Systems    All systems reviewed and negative except for those discussed in HPI.     PHYSICAL EXAM    ED Triage Vitals [12/12/22 1421]   Temp Heart Rate Resp BP SpO2   98.1 °F (36.7 °C) 109 16 145/98 97 %      Temp src Heart Rate Source Patient Position BP Location FiO2 (%)   -- Monitor Sitting Left arm --     Physical Exam      I have reviewed the triage vital signs and nursing notes.      GENERAL: not distressed  HENT: nares patent, no posterior oral pharynx erythema, edema or exudates  EYES: no scleral icterus  NECK: no ROM limitations  CV: regular rhythm, regular rate, no murmur  RESPIRATORY: normal effort, CTAB.  No wheezing. Able to speak in full sentences  ABDOMEN: soft  : deferred  MUSCULOSKELETAL: no deformity  NEURO: alert, moves all extremities, follows commands  SKIN: warm, dry    LAB RESULTS  Recent Results (from the past 24 hour(s))   COVID-19 and FLU A/B PCR - Swab, Nasopharynx    Collection Time: 12/12/22  2:23 PM    Specimen: Nasopharynx; Swab   Result Value Ref Range    COVID19 Not Detected Not Detected - Ref. Range    Influenza A PCR Detected (A) Not Detected    Influenza B PCR Not Detected Not Detected         RADIOLOGY RESULTS  No Radiology Exams Resulted Within Past 24 Hours       PROGRESS, DATA ANALYSIS, CONSULTS AND MEDICAL DECISION MAKING  All labs have been independently reviewed by me.  All radiology studies have been reviewed by me and discussed with radiologist dictating the report.  EKG's independently viewed and interpreted by me unless stated otherwise. Discussion below represents my analysis of pertinent findings related to patient's condition, differential diagnosis, treatment plan and final disposition.       Social Determinants of Health     Financial Resource Strain: Not on file   Food Insecurity: Not on file   Transportation Needs: Not on file   Physical Activity: Not on file   Stress: Not on file   Social  "Connections: Not on file   Intimate Partner Violence: Not on file   Housing Stability: Not on file       Orders placed during this visit:  Orders Placed This Encounter   Procedures   • COVID-19 and FLU A/B PCR - Swab, Nasopharynx         ED Course as of 12/12/22 1506   Mon Dec 12, 2022   1504 Influenza A PCR(!): Detected  I updated patient about positive influenza A.  She is not having any respiratory difficulties.  I will send in a prescription for Zofran to her pharmacy. She is not toxic appearing.  [EW]      ED Course User Index  [EW] Annabelle Blanton APRN          Medication List      New Prescriptions    ondansetron 4 MG tablet  Commonly known as: ZOFRAN  Take 1 tablet by mouth Every 8 (Eight) Hours As Needed for Nausea or Vomiting.           Where to Get Your Medications      These medications were sent to [id number] - [place], [place] - [address] - [phone] PH - [phone] FX  [address] [place] [address] [place] [address]    Phone: [phone]   · ondansetron 4 MG tablet         FOLLOW-UP  Rosie Manuel MD  [address] [place] [address]  [phone]    Schedule an appointment as soon as possible for a visit   As needed         Medication List      New Prescriptions    ondansetron 4 MG tablet  Commonly known as: ZOFRAN  Take 1 tablet by mouth Every 8 (Eight) Hours As Needed for Nausea or Vomiting.           Where to Get Your Medications      These medications were sent to [id number] - [place], [place] - [address] - [phone] PH - [phone] FX  [address] [place] [address] [place] [address]    Phone: [phone]   · ondansetron 4 MG tablet             Disposition vitals:  /98 (BP Location: Left arm, Patient Position: Sitting)   Pulse 109   Temp 98.1 °F (36.7 °C)   Resp 16   Ht 162.6 cm (64\")   Wt 68 kg (150 lb)   LMP 12/12/2022   SpO2 97%   BMI 25.75 kg/m²       DIAGNOSIS  Final diagnoses:   Influenza A       FOLLOW UP   Rosie Manuel " MD Thi  [address] [facility] [address]  [facility] [address]  [phone]    Schedule an appointment as soon as possible for a visit   As needed        Latest Documented Vital Signs:  As of 15:06 EST  BP- 145/98 HR- 109 Temp- 98.1 °F (36.7 °C) O2 sat- 97%    Please note that portions of this were completed with a voice recognition program.     Note Disclaimer: At [facility], we believe that sharing information builds trust and better relationships. You are receiving this note because you are receiving care at [facility] or recently visited. It is possible you will see health information before a provider has talked with you about it. This kind of information can be easy to misunderstand. To help you fully understand what it means for your health, we urge you to discuss this note with your provider.

## 2022-12-20 DIAGNOSIS — Z79.899 HIGH RISK MEDICATION USE: ICD-10-CM

## 2022-12-20 DIAGNOSIS — F90.0 ATTENTION DEFICIT HYPERACTIVITY DISORDER (ADHD), PREDOMINANTLY INATTENTIVE TYPE: ICD-10-CM

## 2022-12-20 DIAGNOSIS — F41.9 ANXIETY: ICD-10-CM

## 2022-12-21 RX ORDER — CLONAZEPAM 1 MG/1
1 TABLET ORAL DAILY PRN
Qty: 30 TABLET | Refills: 0 | Status: SHIPPED | OUTPATIENT
Start: 2022-12-21 | End: 2023-01-25 | Stop reason: SDUPTHER

## 2022-12-21 RX ORDER — DEXTROAMPHETAMINE SACCHARATE, AMPHETAMINE ASPARTATE MONOHYDRATE, DEXTROAMPHETAMINE SULFATE AND AMPHETAMINE SULFATE 5; 5; 5; 5 MG/1; MG/1; MG/1; MG/1
40 CAPSULE, EXTENDED RELEASE ORAL EVERY MORNING
Qty: 60 CAPSULE | Refills: 0 | Status: SHIPPED | OUTPATIENT
Start: 2022-12-21 | End: 2023-01-25

## 2022-12-30 ENCOUNTER — TELEPHONE (OUTPATIENT)
Dept: FAMILY MEDICINE CLINIC | Facility: CLINIC | Age: 27
End: 2022-12-30

## 2022-12-30 DIAGNOSIS — F90.0 ATTENTION DEFICIT HYPERACTIVITY DISORDER (ADHD), PREDOMINANTLY INATTENTIVE TYPE: Primary | ICD-10-CM

## 2022-12-30 RX ORDER — DEXTROAMPHETAMINE SACCHARATE, AMPHETAMINE ASPARTATE MONOHYDRATE, DEXTROAMPHETAMINE SULFATE AND AMPHETAMINE SULFATE 3.75; 3.75; 3.75; 3.75 MG/1; MG/1; MG/1; MG/1
15 CAPSULE, EXTENDED RELEASE ORAL EVERY MORNING
Qty: 30 CAPSULE | Refills: 0 | Status: SHIPPED | OUTPATIENT
Start: 2022-12-30 | End: 2023-01-11

## 2022-12-30 NOTE — TELEPHONE ENCOUNTER
Caller: Arlette Mix    Relationship: Self    Best call back number: [phone]    What is the best time to reach you: ANY TIME - CAN LEAVE A MESSAGE    Who are you requesting to speak with (clinical staff, provider,  specific staff member): CLINICAL STAFF    What was the call regarding: PATIENT STATES THAT SHE HAS BEEN  OUT OF MEDICATION amphetamine-dextroamphetamine XR (Adderall XR) 20 MG 24 hr capsule FOR ONE WEEK.  SHE SAYS THAT THE PHARMACY SAID THAT THEY HAVE 10MG INSTANT AND 30 MG INSTANT AND 15 MG EXTENDED RELEASE IN STOCK IN THIS MEDICATION.  SHE REQUESTS THAT DR. CASTELLON SEND A NEW PRESCRIPTION OVER TO HER PHARMACY    Do you require a callback: YES    PLEASE ADVISE.

## 2023-01-04 ENCOUNTER — TELEPHONE (OUTPATIENT)
Dept: FAMILY MEDICINE CLINIC | Facility: CLINIC | Age: 28
End: 2023-01-04
Payer: MEDICAID

## 2023-01-04 ENCOUNTER — TELEPHONE (OUTPATIENT)
Dept: FAMILY MEDICINE CLINIC | Facility: CLINIC | Age: 28
End: 2023-01-04

## 2023-01-04 DIAGNOSIS — F90.0 ATTENTION DEFICIT HYPERACTIVITY DISORDER (ADHD), PREDOMINANTLY INATTENTIVE TYPE: ICD-10-CM

## 2023-01-04 RX ORDER — DEXTROAMPHETAMINE SACCHARATE, AMPHETAMINE ASPARTATE MONOHYDRATE, DEXTROAMPHETAMINE SULFATE AND AMPHETAMINE SULFATE 3.75; 3.75; 3.75; 3.75 MG/1; MG/1; MG/1; MG/1
15 CAPSULE, EXTENDED RELEASE ORAL EVERY MORNING
Qty: 30 CAPSULE | Refills: 0 | Status: CANCELLED | OUTPATIENT
Start: 2023-01-04

## 2023-01-04 NOTE — TELEPHONE ENCOUNTER
Caller: Arlette Mix Delia    Relationship: Self    Best call back number: [phone]    Requested Prescriptions:   Requested Prescriptions     Pending Prescriptions Disp Refills   • amphetamine-dextroamphetamine XR (Adderall XR) 15 MG 24 hr capsule 30 capsule 0     Sig: Take 1 capsule by mouth Every Morning To temporarily replace the 20 mg dose of this medicine since it is out of stock        Pharmacy where request should be sent: [id number] - [address] - [phone]  - [phone] FX     Additional details provided by patient: PATIENT STATED THIS NEEDS TO BE TAKEN 2 TIMES A DAY INSTEAD OF ONCE.     Does the patient have less than a 3 day supply:  [x] Yes  [] No    Would you like a call back once the refill request has been completed: [x] Yes [] No    If the office needs to give you a call back, can they leave a voicemail: [x] Yes [] No    Ector Jenkins Rep   01/04/23 10:27 EST

## 2023-01-04 NOTE — TELEPHONE ENCOUNTER
Caller: Arlette Mix    Relationship: Self    Best call back number: [phone]    Who are you requesting to speak with (clinical staff, provider,  specific staff member): CLINCIAL    What was the call regarding: PATIENT IS NEEDED PRIOR AUTHORIZATION STARTED FOR MEDICATION clonazePAM (KlonoPIN) 1 MG tablet    Do you require a callback: YES

## 2023-01-11 ENCOUNTER — OFFICE VISIT (OUTPATIENT)
Dept: FAMILY MEDICINE CLINIC | Facility: CLINIC | Age: 28
End: 2023-01-11
Payer: MEDICAID

## 2023-01-11 VITALS
OXYGEN SATURATION: 98 % | DIASTOLIC BLOOD PRESSURE: 95 MMHG | WEIGHT: 168 LBS | SYSTOLIC BLOOD PRESSURE: 130 MMHG | HEART RATE: 116 BPM | TEMPERATURE: 97.3 F | BODY MASS INDEX: 28.84 KG/M2

## 2023-01-11 DIAGNOSIS — F90.0 ATTENTION DEFICIT HYPERACTIVITY DISORDER (ADHD), PREDOMINANTLY INATTENTIVE TYPE: ICD-10-CM

## 2023-01-11 DIAGNOSIS — L40.3 PSORIASIS PLANTARIS: Primary | ICD-10-CM

## 2023-01-11 PROCEDURE — 99213 OFFICE O/P EST LOW 20 MIN: CPT | Performed by: FAMILY MEDICINE

## 2023-01-11 RX ORDER — TRIAMCINOLONE ACETONIDE 1 MG/G
1 CREAM TOPICAL 2 TIMES DAILY
Qty: 80 G | Refills: 1 | Status: SHIPPED | OUTPATIENT
Start: 2023-01-11

## 2023-01-11 RX ORDER — COPPER 313.4 MG/1
1 INTRAUTERINE DEVICE INTRAUTERINE ONCE
COMMUNITY

## 2023-01-12 ENCOUNTER — TELEPHONE (OUTPATIENT)
Dept: FAMILY MEDICINE CLINIC | Facility: CLINIC | Age: 28
End: 2023-01-12

## 2023-01-12 NOTE — TELEPHONE ENCOUNTER
Caller: Arlette Mix    Relationship: Self    Best call back number: [phone]    What was the call regarding: PATIENT STATED SHE HAD AN APPOINTMENT WITH PATEL CASTELLON YESTERDAY 01/11/23 AND SHE WAS SUPPOSE TO SEND OVER 2 MEDICATIONS TO THE PHARMACY.    SHE STATED SHE RECEIVED THE CREAM, BUT PATEL CASTELLON WAS SUPPOSE TO SEND IN HER ADDERALL AND HAVE IT AS 3 15'S A DAY TO REPLACE THE 2 20'S A DAY.    PATIENT WOULD LIKE A CALL ONCE THIS HAS BEEN SENT TO THE PHARMACY.      [id number] - [place], [place] - [address] [place] [address] - [phone]  - [phone] FX        Do you require a callback: YES

## 2023-01-13 RX ORDER — DEXTROAMPHETAMINE SACCHARATE, AMPHETAMINE ASPARTATE MONOHYDRATE, DEXTROAMPHETAMINE SULFATE AND AMPHETAMINE SULFATE 3.75; 3.75; 3.75; 3.75 MG/1; MG/1; MG/1; MG/1
45 CAPSULE, EXTENDED RELEASE ORAL EVERY MORNING
Qty: 90 CAPSULE | Refills: 0 | Status: SHIPPED | OUTPATIENT
Start: 2023-01-13 | End: 2023-01-25

## 2023-01-25 ENCOUNTER — TELEPHONE (OUTPATIENT)
Dept: FAMILY MEDICINE CLINIC | Facility: CLINIC | Age: 28
End: 2023-01-25

## 2023-01-25 DIAGNOSIS — F41.9 ANXIETY: ICD-10-CM

## 2023-01-25 DIAGNOSIS — F90.0 ATTENTION DEFICIT HYPERACTIVITY DISORDER (ADHD), PREDOMINANTLY INATTENTIVE TYPE: Primary | ICD-10-CM

## 2023-01-25 RX ORDER — LISDEXAMFETAMINE DIMESYLATE CAPSULES 40 MG/1
40 CAPSULE ORAL EVERY MORNING
Qty: 30 CAPSULE | Refills: 0 | Status: SHIPPED | OUTPATIENT
Start: 2023-01-25 | End: 2023-02-23 | Stop reason: SDUPTHER

## 2023-01-25 NOTE — TELEPHONE ENCOUNTER
Caller: Arlette Mix    Relationship: Self    Best call back number: [phone]    What medication are you requesting: VYVANSE-PATIENT CURRENTLY TAKES 40 MG OF ADDERALL.     What are your current symptoms: PATIENT STILL CAN NOT GET HER ADDERALL ANYWHERE, DR CASTELLON SUGGESTED SHE COULD SEND SOMETHING ELSE IN FOR HER TO REPLACE IT.     If a prescription is needed, what is your preferred pharmacy and phone number: [id number] - [place] [id number] [facility] - [phone] [facility] [phone]      Additional notes: PLEASE ADVISE WHEN SENT TO PHARMACY.

## 2023-01-25 NOTE — TELEPHONE ENCOUNTER
I changed Rx to Vyvanse.  Please let her know that there are discount cards/coupons online for this medicine.

## 2023-01-29 PROBLEM — L40.3: Status: ACTIVE | Noted: 2023-01-29

## 2023-01-29 NOTE — PROGRESS NOTES
"Chief Complaint  Rash (On going for years, itches  - Rt foot ), ADD, and Contraception (Talk about other options )    Subjective        Arlette Mix presents to [facility]  Rash  This is a new problem. The current episode started more than 1 month ago. The problem is unchanged. The affected locations include the right foot. The rash is characterized by dryness, redness, scaling and itchiness. She was exposed to nothing. Pertinent negatives include no congestion, cough or fever. Past treatments include anti-itch cream and moisturizer. The treatment provided no relief.   ADD  This is a chronic problem. The current episode started more than 1 year ago. The problem occurs constantly. The problem has been unchanged. Associated symptoms include a rash. Pertinent negatives include no chest pain, chills, congestion, coughing, diaphoresis, fever or headaches. The symptoms are aggravated by stress.   Contraception  Associated symptoms include a rash. Pertinent negatives include no chest pain, chills, congestion, coughing, diaphoresis, fever or headaches.       Objective   Vital Signs:  /95 (BP Location: Left arm, Patient Position: Sitting, Cuff Size: Large Adult)   Pulse 116   Temp 97.3 °F (36.3 °C) (Infrared)   Wt 76.2 kg (168 lb)   SpO2 98%   BMI 28.84 kg/m²   Estimated body mass index is 28.84 kg/m² as calculated from the following:    Height as of 12/12/22: 162.6 cm (64\").    Weight as of this encounter: 76.2 kg (168 lb).             Physical Exam  Vitals and nursing note reviewed.   Constitutional:       General: She is not in acute distress.     Appearance: She is well-developed.   HENT:      Head: Normocephalic.   Eyes:      General: Lids are normal.      Conjunctiva/sclera: Conjunctivae normal.   Neck:      Thyroid: No thyroid mass or thyromegaly.      Trachea: Trachea normal.   Cardiovascular:      Rate and Rhythm: Normal rate and regular rhythm.      Heart sounds: " Normal heart sounds.   Pulmonary:      Effort: Pulmonary effort is normal.      Breath sounds: Normal breath sounds.   Abdominal:      Palpations: Abdomen is soft.   Musculoskeletal:      Cervical back: Normal range of motion.   Lymphadenopathy:      Cervical: No cervical adenopathy.   Skin:     General: Skin is warm and dry.      Findings: Rash present. Rash is scaling.      Comments: Right foot   Neurological:      Mental Status: She is alert and oriented to person, place, and time.   Psychiatric:         Attention and Perception: She is attentive.         Mood and Affect: Mood normal.         Speech: Speech normal.         Behavior: Behavior normal.        Result Review :  The following data was reviewed by: Rosie Manuel MD on 01/11/2023:                   Assessment and Plan   Diagnoses and all orders for this visit:    1. Psoriasis plantaris (Primary)  -     triamcinolone (KENALOG) 0.1 % cream; Apply 1 application topically to the appropriate area as directed 2 (Two) Times a Day.  Dispense: 80 g; Refill: 1    2. Attention deficit hyperactivity disorder (ADHD), predominantly inattentive type  -     Discontinue: amphetamine-dextroamphetamine XR (Adderall XR) 15 MG 24 hr capsule; Take 3 capsules by mouth Every Morning To temporarily replace the 20 mg dose of this medicine since it is out of stock  Dispense: 90 capsule; Refill: 0             Follow Up   No follow-ups on file.  Patient was given instructions and counseling regarding her condition or for health maintenance advice. Please see specific information pulled into the AVS if appropriate.       Answers for HPI/ROS submitted by the patient on 1/4/2023  Please describe your symptoms.: Severly dry, cracked, and bleeding, on one foot and hands  Have you had these symptoms before?: Yes  How long have you been having these symptoms?: Greater than 2 weeks  Please list any medications you are currently taking for this condition.: None  Please describe any  probable cause for these symptoms. : Possibly psoriasis, or some other fungal infection  What is the primary reason for your visit?: Other

## 2023-01-30 RX ORDER — CLONAZEPAM 1 MG/1
1 TABLET ORAL DAILY PRN
Qty: 30 TABLET | Refills: 0 | Status: SHIPPED | OUTPATIENT
Start: 2023-01-30 | End: 2023-03-23 | Stop reason: SDUPTHER

## 2023-01-31 DIAGNOSIS — F32.1 CURRENT MODERATE EPISODE OF MAJOR DEPRESSIVE DISORDER, UNSPECIFIED WHETHER RECURRENT: ICD-10-CM

## 2023-01-31 RX ORDER — BUPROPION HYDROCHLORIDE 150 MG/1
150 TABLET ORAL DAILY
Qty: 90 TABLET | Refills: 0 | Status: SHIPPED | OUTPATIENT
Start: 2023-01-31

## 2023-02-23 DIAGNOSIS — F90.0 ATTENTION DEFICIT HYPERACTIVITY DISORDER (ADHD), PREDOMINANTLY INATTENTIVE TYPE: ICD-10-CM

## 2023-03-02 ENCOUNTER — HOSPITAL ENCOUNTER (OUTPATIENT)
Facility: HOSPITAL | Age: 28
Discharge: HOME | End: 2023-03-02
Attending: EMERGENCY MEDICINE | Admitting: EMERGENCY MEDICINE
Payer: MEDICAID

## 2023-03-02 VITALS
HEART RATE: 106 BPM | HEIGHT: 64 IN | BODY MASS INDEX: 25.61 KG/M2 | OXYGEN SATURATION: 98 % | SYSTOLIC BLOOD PRESSURE: 134 MMHG | DIASTOLIC BLOOD PRESSURE: 90 MMHG | TEMPERATURE: 97.7 F | RESPIRATION RATE: 16 BRPM | WEIGHT: 150 LBS

## 2023-03-02 DIAGNOSIS — J02.0 STREP THROAT: Primary | ICD-10-CM

## 2023-03-02 LAB — STREP A PCR: DETECTED

## 2023-03-02 PROCEDURE — 87651 STREP A DNA AMP PROBE: CPT | Performed by: EMERGENCY MEDICINE

## 2023-03-02 PROCEDURE — G0463 HOSPITAL OUTPT CLINIC VISIT: HCPCS | Performed by: EMERGENCY MEDICINE

## 2023-03-02 PROCEDURE — 99203 OFFICE O/P NEW LOW 30 MIN: CPT | Performed by: EMERGENCY MEDICINE

## 2023-03-02 RX ORDER — AMOXICILLIN 500 MG/1
500 CAPSULE ORAL 3 TIMES DAILY
Qty: 30 CAPSULE | Refills: 0 | Status: SHIPPED | OUTPATIENT
Start: 2023-03-02 | End: 2023-03-12

## 2023-03-02 NOTE — TELEPHONE ENCOUNTER
Operative Note      Patient: Sandra Goncalves  YOB: 1942  MRN: [id number]    Date of Procedure: 3/2/2023    Pre-Op Diagnosis: Lumbar radiculopathy [M54.16]    Post-Op Diagnosis: Same       Procedure(s):  LUMBAR TRANSFORAMINAL EPIDURAL STEROID INJECTION BILATERAL S1 UNDER FLUOROSCOPIC GUIDANCE    Surgeon(s):  Rina Zamarripa MD    Assistant:   * No surgical staff found *    Anesthesia: Local    Estimated Blood Loss (mL): Minimal    Complications: None    Specimens:   * No specimens in log *    Implants:  * No implants in log *      Drains: * No LDAs found *    Findings: good needle placement    Detailed Description of Procedure:   3/2/2023    Patient: Sandra Goncalves  :  1942  Age:  [de-identified] y.o. Sex:  female     PRE-OPERATIVE DIAGNOSIS: Lumbar disc displacement, lumbar neural foraminal stenosis, lumbar radiculopathy. POST-OPERATIVE DIAGNOSIS: Same. PROCEDURE: Bilateral Transforaminal epidural steroid injection under fluoroscopic guidance at foraminal level S1.    SURGEON: Rina Zamarripa MD    ANESTHESIA: Local    ESTIMATED BLOOD LOSS: None.  ______________________________________________________________________  BRIEF HISTORY: Sandra Goncalves comes in today for the  Bilateral transforaminal epidural steroid injection under fluoroscopic guidance at foraminal level S1 . The potential complications of this procedure were discussed with her again today. She has elected to undergo the aforementioned procedure. Marcella complete History & Physical examination were reviewed in depth, a copy of which is in the chart. DESCRIPTION OF PROCEDURE:    After confirming written and informed consent, a time-out was performed and Marcella name and date of birth, the procedure to be performed as well as the plan for the location of the needle insertion were confirmed. The patient was brought into the procedure room and placed in the prone position on the fluoroscopy table.  Standard monitors Spoke with pharmacy and confirmed she did need a PA and spoke with pt and let her know I will start a PA but I couldn't tell her when or if it will get approved the time it takes to see if it is approved is any where from admitting to over 24 hours    were placed and vital signs were observed throughout the procedure. The area of the lumbar spine was prepped with chloraprep and draped in a sterile manner. The vertebral body was identified with AP fluoroscopy. An oblique view was obtained to better visualize the inferior junction of the pedicle and transverse process . The 6 o'clock position of the pedicle was marked and identified. The skin and subcutaneous tissue were anesthetized with 0.5% lidocaine. TWO # 22 gauge 3.5 inch pencil point needle was directed toward the targeted point under fluoroscopy until bone was contacted. The needle was then walked inferiorly until the neural foramen was entered . A lateral fluoroscopic view was then used to place the needle tip in the middle of the foramen. Negative aspiration was confirmed for blood and CSF and 0.5-1 cc of Isovue M 300 contrast was injected at each level under live fluoroscopy. Appropriate neurograms were observed under AP fluoroscopy. Then after negative aspiration, a solution of the 3 cc of 0.5% lidocaine and 30 mg DepoMedrol was easily injected at each level. The needles were removed with constant aspiration technique. The patient back was cleaned and a bandage was placed over the needle insertion points    Disposition the patient tolerated the procedure well and there were no complications . Vital signs remained stable throughout the procedure. The patient was escorted to the recovery area where they remained until discharge and written discharge instructions for the procedure were given. Plan: Bhavik Li will return to our pain management center as scheduled.      Yvrose Alex MD

## 2023-03-02 NOTE — FSED PROVIDER NOTE
Subjective   History of Present Illness  Sore throat for 1 day.  Positive sick contacts.  No fevers, chills, or issues handling secretions.  No distress        Review of Systems   All other systems reviewed and are negative.      Past Medical History:   Diagnosis Date   • ADHD (attention deficit hyperactivity disorder)    • Anemia    • Anxiety    • Colon polyp    • Depression    • Pancreatitis    • Right radial head fracture 2019       No Known Allergies    Past Surgical History:   Procedure Laterality Date   •  SECTION     • CHOLECYSTECTOMY  2019   • COLONOSCOPY         Family History   Problem Relation Age of Onset   • Migraines Mother        Social History     Socioeconomic History   • Marital status: Single   Tobacco Use   • Smoking status: Never   • Smokeless tobacco: Never   Substance and Sexual Activity   • Alcohol use: Yes     Comment: caffeine 1c qd   • Drug use: Never   • Sexual activity: Yes     Partners: Male     Birth control/protection: I.U.D.     Comment: Would like to find another option for birth control           Objective   Physical Exam  Vitals and nursing note reviewed.   Constitutional:       Appearance: Normal appearance.   HENT:      Head: Normocephalic.      Right Ear: External ear normal.      Left Ear: External ear normal.      Nose: Nose normal.      Mouth/Throat:      Pharynx: Posterior oropharyngeal erythema present. No pharyngeal swelling or oropharyngeal exudate.      Tonsils: No tonsillar abscesses. 1+ on the right. 1+ on the left.   Eyes:      Conjunctiva/sclera: Conjunctivae normal.   Cardiovascular:      Rate and Rhythm: Normal rate.   Pulmonary:      Effort: Pulmonary effort is normal.   Abdominal:      General: There is no distension.   Musculoskeletal:      Cervical back: Normal range of motion.   Skin:     Findings: No rash.   Neurological:      Mental Status: She is alert and oriented to person, place, and time.   Psychiatric:         Mood and Affect: Mood  normal.         Procedures           ED Course                                           Medical Decision Making  Antibiotics for positive strep test today    Strep throat: complicated acute illness or injury  Risk  Prescription drug management.          Final diagnoses:   Strep throat       ED Disposition  ED Disposition     ED Disposition   Discharge    Condition   Stable    Comment   --             Rosie Manuel MD  [address]  [place] [address] [phone]               Medication List      New Prescriptions    amoxicillin 500 MG capsule  Commonly known as: AMOXIL  Take 1 capsule by mouth 3 (Three) Times a Day for 10 days.           Where to Get Your Medications      These medications were sent to [id number] - [place], [place] - [address] - [phone]  - [phone]  [address] [place] [address]    Phone: [phone]   · amoxicillin 500 MG capsule

## 2023-03-23 DIAGNOSIS — F41.9 ANXIETY: ICD-10-CM

## 2023-03-23 DIAGNOSIS — F90.0 ATTENTION DEFICIT HYPERACTIVITY DISORDER (ADHD), PREDOMINANTLY INATTENTIVE TYPE: ICD-10-CM

## 2023-03-24 ENCOUNTER — TELEPHONE (OUTPATIENT)
Dept: FAMILY MEDICINE CLINIC | Facility: CLINIC | Age: 28
End: 2023-03-24

## 2023-03-24 DIAGNOSIS — F41.9 ANXIETY: Primary | ICD-10-CM

## 2023-03-24 RX ORDER — CLONAZEPAM 1 MG/1
1 TABLET ORAL DAILY PRN
Qty: 30 TABLET | Refills: 0 | Status: SHIPPED | OUTPATIENT
Start: 2023-03-24

## 2023-03-24 RX ORDER — CLONAZEPAM 0.5 MG/1
1 TABLET ORAL DAILY PRN
Qty: 60 TABLET | Refills: 0 | Status: SHIPPED | OUTPATIENT
Start: 2023-03-24 | End: 2023-04-18 | Stop reason: SDUPTHER

## 2023-03-24 NOTE — TELEPHONE ENCOUNTER
Caller: Arlette Mix    Relationship: Self    Best call back number: [phone]    What medication are you requesting:     ALTERNATIVE TO clonazePAM (KlonoPIN) 1 MG tablet    Have you had these symptoms before:    [x] Yes  [] No    Have you been treated for these symptoms before:   [x] Yes  [] No    If a prescription is needed, what is your preferred pharmacy and phone number: [id number] - [place] [address] [facility] - [phone] [facility] [phone] FX     Additional notes:    PHARMACY HAS TOLD PATIENT THAT THERE HAS BEEN A MANUFACTURES RECALL OF THE PRESCRIBED MEDICATION.

## 2023-03-24 NOTE — TELEPHONE ENCOUNTER
I called the [facility] pharmacy and they said the clonazepam 1 mg has been recalled but they do have the 0.5 mg in stock.  I will send in a prescription for the 0.5 mg and she can take 2 at a time to equal 1 mg.

## 2023-04-18 DIAGNOSIS — F41.9 ANXIETY: ICD-10-CM

## 2023-04-18 RX ORDER — CLONAZEPAM 0.5 MG/1
1 TABLET ORAL DAILY PRN
Qty: 60 TABLET | Refills: 1 | Status: SHIPPED | OUTPATIENT
Start: 2023-04-18

## 2023-04-26 DIAGNOSIS — F90.0 ATTENTION DEFICIT HYPERACTIVITY DISORDER (ADHD), PREDOMINANTLY INATTENTIVE TYPE: ICD-10-CM

## 2023-05-01 DIAGNOSIS — F32.1 CURRENT MODERATE EPISODE OF MAJOR DEPRESSIVE DISORDER, UNSPECIFIED WHETHER RECURRENT: ICD-10-CM

## 2023-05-01 RX ORDER — BUPROPION HYDROCHLORIDE 150 MG/1
150 TABLET ORAL DAILY
Qty: 90 TABLET | Refills: 1 | Status: SHIPPED | OUTPATIENT
Start: 2023-05-01

## 2023-05-25 ENCOUNTER — TELEPHONE (OUTPATIENT)
Dept: FAMILY MEDICINE CLINIC | Facility: CLINIC | Age: 28
End: 2023-05-25

## 2023-05-25 NOTE — TELEPHONE ENCOUNTER
Caller: Arlette Mix    Relationship: Self    Best call back number: [phone]    Requested Prescriptions:   ADDERALL 20MG TWICE DAILY    Pharmacy where request should be sent: [id number] - [address] - [phone]  - [phone] FX     Last office visit with prescribing clinician: 1/11/2023   Last telemedicine visit with prescribing clinician: Visit date not found   Next office visit with prescribing clinician: Visit date not found     Additional details provided by patient: PATIENT FILLED VYVANSE LAST TIME SINCE ADDERALL WAS OUT OF STOCK      Ector Sotelo Rep   05/25/23 13:31 EDT

## 2023-05-26 DIAGNOSIS — F90.0 ATTENTION DEFICIT HYPERACTIVITY DISORDER (ADHD), PREDOMINANTLY INATTENTIVE TYPE: Primary | ICD-10-CM

## 2023-05-26 RX ORDER — DEXTROAMPHETAMINE SACCHARATE, AMPHETAMINE ASPARTATE MONOHYDRATE, DEXTROAMPHETAMINE SULFATE AND AMPHETAMINE SULFATE 5; 5; 5; 5 MG/1; MG/1; MG/1; MG/1
40 CAPSULE, EXTENDED RELEASE ORAL EVERY MORNING
Qty: 60 CAPSULE | Refills: 0 | Status: SHIPPED | OUTPATIENT
Start: 2023-05-26

## 2023-05-26 RX ORDER — DEXTROAMPHETAMINE SACCHARATE, AMPHETAMINE ASPARTATE MONOHYDRATE, DEXTROAMPHETAMINE SULFATE AND AMPHETAMINE SULFATE 5; 5; 5; 5 MG/1; MG/1; MG/1; MG/1
CAPSULE, EXTENDED RELEASE ORAL
COMMUNITY
Start: 2023-04-27 | End: 2023-05-26 | Stop reason: SDUPTHER

## 2023-05-26 NOTE — TELEPHONE ENCOUNTER
I got a request for Adderall from her pharmacy but I thought she was on Vyvanse.  Please find out which one she wants refilled.

## 2023-06-28 ENCOUNTER — TELEPHONE (OUTPATIENT)
Dept: FAMILY MEDICINE CLINIC | Facility: CLINIC | Age: 28
End: 2023-06-28

## 2023-06-28 DIAGNOSIS — F90.0 ATTENTION DEFICIT HYPERACTIVITY DISORDER (ADHD), PREDOMINANTLY INATTENTIVE TYPE: Primary | ICD-10-CM

## 2023-06-28 RX ORDER — LISDEXAMFETAMINE DIMESYLATE CAPSULES 40 MG/1
40 CAPSULE ORAL EVERY MORNING
Qty: 30 CAPSULE | Refills: 0 | Status: SHIPPED | OUTPATIENT
Start: 2023-06-28

## 2023-06-28 NOTE — TELEPHONE ENCOUNTER
Caller: Arlette Mix    Relationship: Self    Best call back number: [phone]    What was the call regarding: [facility] DOES NOT HAVE THE amphetamine-dextroamphetamine XR (ADDERALL XR) 20 MG 24 hr capsule [[id number]] (Order [id number])  IN STOCK.  HOWEVER THEY HAVE THE VYVANSE 40 MG.  REQUESTING DR. CASTELLON SWITCH THE PRESCRIPTION TO VYVANSE.      PHARMACY: [id number] [address] - [phone] [facility] [phone]      Is it okay if the provider responds through MyChart: YES

## 2023-07-25 DIAGNOSIS — F41.9 ANXIETY: ICD-10-CM

## 2023-07-25 DIAGNOSIS — F90.0 ATTENTION DEFICIT HYPERACTIVITY DISORDER (ADHD), PREDOMINANTLY INATTENTIVE TYPE: ICD-10-CM

## 2023-07-26 RX ORDER — CLONAZEPAM 0.5 MG/1
1 TABLET ORAL DAILY PRN
Qty: 60 TABLET | Refills: 1 | Status: SHIPPED | OUTPATIENT
Start: 2023-07-26

## 2023-07-26 RX ORDER — DEXTROAMPHETAMINE SACCHARATE, AMPHETAMINE ASPARTATE MONOHYDRATE, DEXTROAMPHETAMINE SULFATE AND AMPHETAMINE SULFATE 5; 5; 5; 5 MG/1; MG/1; MG/1; MG/1
40 CAPSULE, EXTENDED RELEASE ORAL EVERY MORNING
Qty: 60 CAPSULE | Refills: 0 | Status: SHIPPED | OUTPATIENT
Start: 2023-07-26

## 2023-08-23 DIAGNOSIS — F41.9 ANXIETY: ICD-10-CM

## 2023-08-23 DIAGNOSIS — F90.0 ATTENTION DEFICIT HYPERACTIVITY DISORDER (ADHD), PREDOMINANTLY INATTENTIVE TYPE: ICD-10-CM

## 2023-08-23 RX ORDER — CLONAZEPAM 0.5 MG/1
1 TABLET ORAL DAILY PRN
Qty: 60 TABLET | Refills: 1 | Status: SHIPPED | OUTPATIENT
Start: 2023-08-23

## 2023-08-23 RX ORDER — DEXTROAMPHETAMINE SACCHARATE, AMPHETAMINE ASPARTATE MONOHYDRATE, DEXTROAMPHETAMINE SULFATE AND AMPHETAMINE SULFATE 5; 5; 5; 5 MG/1; MG/1; MG/1; MG/1
40 CAPSULE, EXTENDED RELEASE ORAL EVERY MORNING
Qty: 60 CAPSULE | Refills: 0 | Status: SHIPPED | OUTPATIENT
Start: 2023-08-23

## 2023-08-28 ENCOUNTER — HOSPITAL ENCOUNTER (OUTPATIENT)
Facility: HOSPITAL | Age: 28
Discharge: HOME | End: 2023-08-28
Admitting: PEDIATRICS
Payer: MEDICAID

## 2023-08-28 VITALS
HEART RATE: 123 BPM | OXYGEN SATURATION: 100 % | TEMPERATURE: 97.8 F | RESPIRATION RATE: 16 BRPM | DIASTOLIC BLOOD PRESSURE: 94 MMHG | BODY MASS INDEX: 25.61 KG/M2 | SYSTOLIC BLOOD PRESSURE: 139 MMHG | HEIGHT: 64 IN | WEIGHT: 150 LBS

## 2023-08-28 DIAGNOSIS — J02.0 STREP SORE THROAT: Primary | ICD-10-CM

## 2023-08-28 LAB
FLUAV SUBTYP SPEC NAA+PROBE: NOT DETECTED
FLUBV RNA ISLT QL NAA+PROBE: NOT DETECTED
SARS-COV-2 RNA RESP QL NAA+PROBE: NOT DETECTED
STREP A PCR: DETECTED

## 2023-08-28 PROCEDURE — 25010000002 DEXAMETHASONE SODIUM PHOSPHATE 10 MG/ML SOLUTION

## 2023-08-28 PROCEDURE — G0463 HOSPITAL OUTPT CLINIC VISIT: HCPCS

## 2023-08-28 PROCEDURE — 87651 STREP A DNA AMP PROBE: CPT

## 2023-08-28 PROCEDURE — 87636 SARSCOV2 & INF A&B AMP PRB: CPT

## 2023-08-28 RX ORDER — AMOXICILLIN 500 MG/1
500 CAPSULE ORAL 2 TIMES DAILY
Qty: 20 CAPSULE | Refills: 0 | Status: SHIPPED | OUTPATIENT
Start: 2023-08-28

## 2023-08-28 RX ORDER — AMOXICILLIN 500 MG/1
500 CAPSULE ORAL 2 TIMES DAILY
Qty: 20 CAPSULE | Refills: 0 | Status: SHIPPED | OUTPATIENT
Start: 2023-08-28 | End: 2023-08-28 | Stop reason: SDUPTHER

## 2023-08-28 RX ORDER — DEXAMETHASONE SODIUM PHOSPHATE 10 MG/ML
10 INJECTION, SOLUTION INTRAMUSCULAR; INTRAVENOUS ONCE
Status: COMPLETED | OUTPATIENT
Start: 2023-08-28 | End: 2023-08-28

## 2023-08-28 RX ADMIN — DEXAMETHASONE SODIUM PHOSPHATE 10 MG: 10 INJECTION, SOLUTION INTRAMUSCULAR; INTRAVENOUS at 10:50

## 2023-08-28 NOTE — DISCHARGE INSTRUCTIONS
Prescription sent to your preferred pharmacy    Increase fluids, do not share utensils, change toothbrush    Follow-up with primary care  Alternate Tylenol and ibuprofen as needed for discomfort    Return to the ED for new or worsening symptoms

## 2023-08-28 NOTE — FSED PROVIDER NOTE
Subjective   History of Present Illness  Chief Complaint: Sore throat      HPI: Patient is a 27-year-old female who presents to the emergency room by private vehicle with complaints of sore throat x1 week, daughter had similar symptoms shortly before the onset.  She feels feverish, denies nausea or vomiting.  Heart rate slightly elevated initial evaluation she states that this is her baseline has no associated chest pain shortness of breath palpitations.    PCP: Mar    History provided by:  Patient    Review of Systems   HENT:  Positive for sore throat.    Musculoskeletal:  Positive for myalgias.     Past Medical History:   Diagnosis Date    ADHD (attention deficit hyperactivity disorder)     Anemia     Anxiety     Colon polyp     Depression     Pancreatitis     Right radial head fracture 2019       No Known Allergies    Past Surgical History:   Procedure Laterality Date     SECTION      CHOLECYSTECTOMY  2019    COLONOSCOPY         Family History   Problem Relation Age of Onset    Migraines Mother        Social History     Socioeconomic History    Marital status: Single   Tobacco Use    Smoking status: Never    Smokeless tobacco: Never   Substance and Sexual Activity    Alcohol use: Yes     Comment: caffeine 1c qd    Drug use: Never    Sexual activity: Yes     Partners: Male     Birth control/protection: I.U.D.     Comment: Would like to find another option for birth control           Objective   Physical Exam  Vitals reviewed.   Constitutional:       General: She is not in acute distress.  HENT:      Head: Normocephalic.      Jaw: No trismus.      Right Ear: Tympanic membrane and ear canal normal.      Left Ear: Tympanic membrane and ear canal normal.      Mouth/Throat:      Pharynx: Pharyngeal swelling and posterior oropharyngeal erythema present.      Tonsils: 2+ on the right. 2+ on the left.      Comments: Bilateral tonsillar swelling without exudate no evidence of peritonsillar  "abscess  Cardiovascular:      Rate and Rhythm: Tachycardia present.      Heart sounds: No murmur heard.  Pulmonary:      Effort: Pulmonary effort is normal.      Breath sounds: Normal breath sounds. No wheezing.   Abdominal:      General: Bowel sounds are normal.      Palpations: Abdomen is soft.   Musculoskeletal:         General: Normal range of motion.      Cervical back: Neck supple. No rigidity.   Skin:     General: Skin is warm and dry.   Neurological:      Mental Status: She is alert.       Procedures           ED Course      /94 (BP Location: Right arm, Patient Position: Sitting)   Pulse (!) 123 Comment: Patient reports that she is always tachycardic  Temp 97.8 øF (36.6 øC) (Oral)   Resp 16   Ht 162.6 cm (64\")   Wt 68 kg (150 lb)   LMP 08/21/2023 (Approximate)   SpO2 100%   BMI 25.75 kg/mý   Labs Reviewed   RAPID STREP A SCREEN - Abnormal; Notable for the following components:       Result Value    STREP A PCR Detected (*)     All other components within normal limits   COVID-19 AND FLU A/B, NP SWAB IN TRANSPORT MEDIA 8-12 HR TAT - Normal    Narrative:     Fact sheet for providers: https://www.fda.gov/media/224813/download    Fact sheet for patients: https://www.fda.gov/media/001202/download    Test performed by PCR.     Medications   dexAMETHasone sodium phosphate injection 10 mg (10 mg Intramuscular Given 8/28/23 1050)     No radiology results for the last day                                       Medical Decision Making  Patient presented to the emergency room with weeklong history of sore throat with possible exposure.  COVID-19 and influenza were negative, strep testing was positive she was given a dose of Decadron with a prescription for amoxicillin sent to her pharmacy she was also given a printed prescription as there has been multiple pharmacies reporting shortage.  We discussed nonpharmacological treatment of her symptoms as well as Tylenol ibuprofen as needed for discomfort or " fevers.  Advised to follow-up with primary care.  Patient gave verbal understanding of discharge instructions, she was alert oriented nontoxic in appearance time of discharge, denied further questions or complaints.    Chart review: 1/11/2023 outpatient visit with PCP related to psoriasis plantaris      Note Disclaimer: At [facility], we believe that sharing information builds trust and better  relationships. You are receiving this note because you recently visited [facility]. It is possible you will see health information before a provider has talked with you about it. This kind of information can be easy to misunderstand. To help you fully understand what it means for your health, we urge you to discuss this note with your provider.       Part of this note may be an electronic transcription/translation of spoken language to printed text using the Dragon Dictation System.    Appropriate PPE worn during exam.    Problems Addressed:  Strep sore throat: complicated acute illness or injury    Amount and/or Complexity of Data Reviewed  External Data Reviewed: notes.  Labs: ordered. Decision-making details documented in ED Course.    Risk  Prescription drug management.        Final diagnoses:   Strep sore throat       ED Disposition  ED Disposition       ED Disposition   Discharge    Condition   Stable    Comment   --               Rosie Manuel MD  [address] [place] [id number] [phone]               Medication List        New Prescriptions      amoxicillin 500 MG capsule  Commonly known as: AMOXIL  Take 1 capsule by mouth 2 (Two) Times a Day.               Where to Get Your Medications        You can get these medications from any pharmacy    Bring a paper prescription for each of these medications  amoxicillin 500 MG capsule

## 2023-09-22 DIAGNOSIS — F41.9 ANXIETY: ICD-10-CM

## 2023-09-22 DIAGNOSIS — F90.0 ATTENTION DEFICIT HYPERACTIVITY DISORDER (ADHD), PREDOMINANTLY INATTENTIVE TYPE: ICD-10-CM

## 2023-09-22 RX ORDER — DEXTROAMPHETAMINE SACCHARATE, AMPHETAMINE ASPARTATE MONOHYDRATE, DEXTROAMPHETAMINE SULFATE AND AMPHETAMINE SULFATE 5; 5; 5; 5 MG/1; MG/1; MG/1; MG/1
40 CAPSULE, EXTENDED RELEASE ORAL EVERY MORNING
Qty: 60 CAPSULE | Refills: 0 | Status: SHIPPED | OUTPATIENT
Start: 2023-09-22

## 2023-09-22 RX ORDER — CLONAZEPAM 0.5 MG/1
1 TABLET ORAL DAILY PRN
Qty: 60 TABLET | Refills: 1 | Status: SHIPPED | OUTPATIENT
Start: 2023-09-22

## 2023-10-23 DIAGNOSIS — F41.9 ANXIETY: ICD-10-CM

## 2023-10-23 DIAGNOSIS — F90.0 ATTENTION DEFICIT HYPERACTIVITY DISORDER (ADHD), PREDOMINANTLY INATTENTIVE TYPE: ICD-10-CM

## 2023-10-23 RX ORDER — DEXTROAMPHETAMINE SACCHARATE, AMPHETAMINE ASPARTATE MONOHYDRATE, DEXTROAMPHETAMINE SULFATE AND AMPHETAMINE SULFATE 5; 5; 5; 5 MG/1; MG/1; MG/1; MG/1
40 CAPSULE, EXTENDED RELEASE ORAL EVERY MORNING
Qty: 60 CAPSULE | Refills: 0 | Status: SHIPPED | OUTPATIENT
Start: 2023-10-23

## 2023-10-23 RX ORDER — CLONAZEPAM 0.5 MG/1
1 TABLET ORAL DAILY PRN
Qty: 60 TABLET | Refills: 1 | Status: SHIPPED | OUTPATIENT
Start: 2023-10-23

## 2023-11-13 ENCOUNTER — TELEPHONE (OUTPATIENT)
Dept: FAMILY MEDICINE CLINIC | Facility: CLINIC | Age: 28
End: 2023-11-13

## 2023-11-13 RX ORDER — BUPROPION HYDROCHLORIDE 300 MG/1
300 TABLET ORAL EVERY MORNING
Qty: 90 TABLET | Refills: 0 | Status: SHIPPED | OUTPATIENT
Start: 2023-11-13

## 2023-11-13 NOTE — TELEPHONE ENCOUNTER
I will increase the dose of her bupropion  XL from 150 mg to 300 mg.  If this does not help then she will need to make an appointment to see me to discuss.

## 2023-11-13 NOTE — TELEPHONE ENCOUNTER
Caller: Arlette Mix    Relationship: Self    Best call back number: [phone]    PATIENT IS WONDERING IF SHE CAN CHANGE BA MEDICATION.    SHE CURRENTLY TAKES BUPROPION XL FOR DEPRESSION BUT WANTED TO CHANGE THIS TO SOMETHING DIFFERENT OR INCREASE THIS MEDICATION.       PLEASE GIVE PATIENT A CALLBACK

## 2023-11-21 DIAGNOSIS — F41.9 ANXIETY: ICD-10-CM

## 2023-11-21 DIAGNOSIS — F90.0 ATTENTION DEFICIT HYPERACTIVITY DISORDER (ADHD), PREDOMINANTLY INATTENTIVE TYPE: ICD-10-CM

## 2023-11-21 RX ORDER — CLONAZEPAM 0.5 MG/1
1 TABLET ORAL DAILY PRN
Qty: 60 TABLET | Refills: 1 | Status: SHIPPED | OUTPATIENT
Start: 2023-11-21

## 2023-11-21 RX ORDER — DEXTROAMPHETAMINE SACCHARATE, AMPHETAMINE ASPARTATE MONOHYDRATE, DEXTROAMPHETAMINE SULFATE AND AMPHETAMINE SULFATE 5; 5; 5; 5 MG/1; MG/1; MG/1; MG/1
40 CAPSULE, EXTENDED RELEASE ORAL EVERY MORNING
Qty: 60 CAPSULE | Refills: 0 | Status: SHIPPED | OUTPATIENT
Start: 2023-11-21

## 2023-12-26 ENCOUNTER — HOSPITAL ENCOUNTER (OUTPATIENT)
Facility: HOSPITAL | Age: 28
Discharge: HOME | End: 2023-12-26
Attending: EMERGENCY MEDICINE | Admitting: EMERGENCY MEDICINE
Payer: MEDICAID

## 2023-12-26 ENCOUNTER — APPOINTMENT (OUTPATIENT)
Dept: GENERAL RADIOLOGY | Facility: HOSPITAL | Age: 28
End: 2023-12-26
Payer: MEDICAID

## 2023-12-26 VITALS
DIASTOLIC BLOOD PRESSURE: 89 MMHG | SYSTOLIC BLOOD PRESSURE: 136 MMHG | TEMPERATURE: 98 F | OXYGEN SATURATION: 98 % | HEIGHT: 64 IN | WEIGHT: 160 LBS | BODY MASS INDEX: 27.31 KG/M2 | RESPIRATION RATE: 16 BRPM | HEART RATE: 98 BPM

## 2023-12-26 DIAGNOSIS — J21.0 RSV (ACUTE BRONCHIOLITIS DUE TO RESPIRATORY SYNCYTIAL VIRUS): Primary | ICD-10-CM

## 2023-12-26 DIAGNOSIS — F90.0 ATTENTION DEFICIT HYPERACTIVITY DISORDER (ADHD), PREDOMINANTLY INATTENTIVE TYPE: ICD-10-CM

## 2023-12-26 LAB
B-HCG UR QL: NEGATIVE
FLUAV SUBTYP SPEC NAA+PROBE: NOT DETECTED
FLUBV RNA ISLT QL NAA+PROBE: NOT DETECTED
RSV RNA NPH QL NAA+NON-PROBE: DETECTED
SARS-COV-2 RNA RESP QL NAA+PROBE: NOT DETECTED

## 2023-12-26 PROCEDURE — 71046 X-RAY EXAM CHEST 2 VIEWS: CPT

## 2023-12-26 PROCEDURE — 81025 URINE PREGNANCY TEST: CPT | Performed by: EMERGENCY MEDICINE

## 2023-12-26 PROCEDURE — 87634 RSV DNA/RNA AMP PROBE: CPT | Performed by: EMERGENCY MEDICINE

## 2023-12-26 PROCEDURE — 63710000001 PREDNISONE PER 1 MG: Performed by: EMERGENCY MEDICINE

## 2023-12-26 PROCEDURE — G0463 HOSPITAL OUTPT CLINIC VISIT: HCPCS | Performed by: EMERGENCY MEDICINE

## 2023-12-26 PROCEDURE — 87636 SARSCOV2 & INF A&B AMP PRB: CPT | Performed by: EMERGENCY MEDICINE

## 2023-12-26 RX ORDER — FLUTICASONE PROPIONATE 50 MCG
2 SPRAY, SUSPENSION (ML) NASAL DAILY
Qty: 15.8 ML | Refills: 0 | Status: SHIPPED | OUTPATIENT
Start: 2023-12-26

## 2023-12-26 RX ORDER — IPRATROPIUM BROMIDE AND ALBUTEROL SULFATE 2.5; .5 MG/3ML; MG/3ML
3 SOLUTION RESPIRATORY (INHALATION) ONCE
Status: COMPLETED | OUTPATIENT
Start: 2023-12-26 | End: 2023-12-26

## 2023-12-26 RX ORDER — PREDNISONE 20 MG/1
40 TABLET ORAL DAILY
Qty: 10 TABLET | Refills: 0 | Status: SHIPPED | OUTPATIENT
Start: 2023-12-26 | End: 2023-12-31

## 2023-12-26 RX ORDER — DEXTROAMPHETAMINE SACCHARATE, AMPHETAMINE ASPARTATE MONOHYDRATE, DEXTROAMPHETAMINE SULFATE AND AMPHETAMINE SULFATE 5; 5; 5; 5 MG/1; MG/1; MG/1; MG/1
40 CAPSULE, EXTENDED RELEASE ORAL EVERY MORNING
Qty: 60 CAPSULE | Refills: 0 | Status: SHIPPED | OUTPATIENT
Start: 2023-12-26

## 2023-12-26 RX ORDER — ALBUTEROL SULFATE 90 UG/1
2 AEROSOL, METERED RESPIRATORY (INHALATION) EVERY 4 HOURS PRN
Qty: 8 G | Refills: 0 | Status: SHIPPED | OUTPATIENT
Start: 2023-12-26

## 2023-12-26 RX ORDER — PREDNISONE 20 MG/1
50 TABLET ORAL ONCE
Status: COMPLETED | OUTPATIENT
Start: 2023-12-26 | End: 2023-12-26

## 2023-12-26 RX ADMIN — IPRATROPIUM BROMIDE AND ALBUTEROL SULFATE 3 ML: .5; 3 SOLUTION RESPIRATORY (INHALATION) at 09:22

## 2023-12-26 RX ADMIN — PREDNISONE 50 MG: 20 TABLET ORAL at 09:22

## 2023-12-26 NOTE — FSED PROVIDER NOTE
Subjective   History of Present Illness  28-year-old female coming in for cough congestion runny nose sore throat patient states she has been sick for the past week or so worsening cough prompted her to come in no chest pain no shortness of breath no dizziness nausea vomiting fevers chills        Review of Systems   Constitutional:  Negative for activity change and appetite change.   HENT:  Positive for rhinorrhea and sinus pressure. Negative for congestion.    Respiratory:  Positive for cough. Negative for shortness of breath.    Cardiovascular:  Negative for chest pain and leg swelling.   Gastrointestinal:  Negative for abdominal pain.   Genitourinary:  Negative for difficulty urinating and dysuria.   Neurological:  Negative for dizziness and headaches.   All other systems reviewed and are negative.      Past Medical History:   Diagnosis Date    ADHD (attention deficit hyperactivity disorder)     Anemia     Anxiety     Colon polyp     Depression     Pancreatitis     Right radial head fracture 2019       No Known Allergies    Past Surgical History:   Procedure Laterality Date     SECTION      CHOLECYSTECTOMY  2019    COLONOSCOPY         Family History   Problem Relation Age of Onset    Migraines Mother        Social History     Socioeconomic History    Marital status: Single   Tobacco Use    Smoking status: Never    Smokeless tobacco: Never   Substance and Sexual Activity    Alcohol use: Yes     Comment: caffeine 1c qd    Drug use: Never    Sexual activity: Yes     Partners: Male     Birth control/protection: I.U.D.     Comment: Would like to find another option for birth control           Objective   Physical Exam  Vitals and nursing note reviewed.   Constitutional:       General: She is not in acute distress.     Appearance: She is well-developed. She is not ill-appearing.   HENT:      Head: Normocephalic and atraumatic.      Right Ear: Tympanic membrane normal.      Left Ear: Tympanic membrane  normal.      Nose: No congestion or rhinorrhea.   Cardiovascular:      Rate and Rhythm: Tachycardia present.   Pulmonary:      Effort: Pulmonary effort is normal. No respiratory distress.      Breath sounds: No stridor. Wheezing present.   Neurological:      Mental Status: She is alert.         Procedures           ED Course                                           Medical Decision Making  28-year-old female coming in for cough runny nose sore throat found to be RSV positive chest x-ray unremarkable patient given breathing treatment status post improvement in symptoms and prednisone subsequently discharged home    Problems Addressed:  RSV (acute bronchiolitis due to respiratory syncytial virus): complicated acute illness or injury    Amount and/or Complexity of Data Reviewed  Labs: ordered.  Radiology: ordered.    Risk  Prescription drug management.        Final diagnoses:   None       ED Disposition  ED Disposition       None            No follow-up provider specified.       Medication List      No changes were made to your prescriptions during this visit.

## 2024-01-01 DIAGNOSIS — F41.9 ANXIETY: ICD-10-CM

## 2024-01-02 RX ORDER — CLONAZEPAM 0.5 MG/1
1 TABLET ORAL DAILY PRN
Qty: 60 TABLET | Refills: 1 | Status: SHIPPED | OUTPATIENT
Start: 2024-01-02

## 2024-01-22 DIAGNOSIS — F90.0 ATTENTION DEFICIT HYPERACTIVITY DISORDER (ADHD), PREDOMINANTLY INATTENTIVE TYPE: ICD-10-CM

## 2024-01-22 RX ORDER — DEXTROAMPHETAMINE SACCHARATE, AMPHETAMINE ASPARTATE MONOHYDRATE, DEXTROAMPHETAMINE SULFATE AND AMPHETAMINE SULFATE 5; 5; 5; 5 MG/1; MG/1; MG/1; MG/1
40 CAPSULE, EXTENDED RELEASE ORAL EVERY MORNING
Qty: 60 CAPSULE | Refills: 0 | Status: SHIPPED | OUTPATIENT
Start: 2024-01-22

## 2024-02-05 DIAGNOSIS — F41.9 ANXIETY: ICD-10-CM

## 2024-02-05 RX ORDER — CLONAZEPAM 0.5 MG/1
1 TABLET ORAL DAILY PRN
Qty: 60 TABLET | Refills: 1 | Status: SHIPPED | OUTPATIENT
Start: 2024-02-05

## 2024-02-07 RX ORDER — BUPROPION HYDROCHLORIDE 300 MG/1
300 TABLET ORAL EVERY MORNING
Qty: 90 TABLET | Refills: 0 | Status: SHIPPED | OUTPATIENT
Start: 2024-02-07

## 2024-02-23 DIAGNOSIS — F90.0 ATTENTION DEFICIT HYPERACTIVITY DISORDER (ADHD), PREDOMINANTLY INATTENTIVE TYPE: ICD-10-CM

## 2024-02-23 RX ORDER — DEXTROAMPHETAMINE SACCHARATE, AMPHETAMINE ASPARTATE MONOHYDRATE, DEXTROAMPHETAMINE SULFATE AND AMPHETAMINE SULFATE 5; 5; 5; 5 MG/1; MG/1; MG/1; MG/1
40 CAPSULE, EXTENDED RELEASE ORAL EVERY MORNING
Qty: 60 CAPSULE | Refills: 0 | Status: SHIPPED | OUTPATIENT
Start: 2024-02-23 | End: 2024-02-23 | Stop reason: SDUPTHER

## 2024-02-23 RX ORDER — DEXTROAMPHETAMINE SACCHARATE, AMPHETAMINE ASPARTATE MONOHYDRATE, DEXTROAMPHETAMINE SULFATE AND AMPHETAMINE SULFATE 5; 5; 5; 5 MG/1; MG/1; MG/1; MG/1
40 CAPSULE, EXTENDED RELEASE ORAL EVERY MORNING
Qty: 60 CAPSULE | Refills: 0 | Status: SHIPPED | OUTPATIENT
Start: 2024-02-23

## 2024-02-23 NOTE — TELEPHONE ENCOUNTER
Pt was verbally notified and understood she did state the Wellbutrin is on backorder - pt was advised to call around to see if another pharmacy has this in stock if not let us know she may need a alternative called in

## 2024-02-23 NOTE — TELEPHONE ENCOUNTER
Please let her know that I have not seen her in over a year.  I refilled her medicine but she needs to schedule an appointment to see me before any further refills.

## 2024-04-22 ENCOUNTER — OFFICE VISIT (OUTPATIENT)
Dept: FAMILY MEDICINE CLINIC | Facility: CLINIC | Age: 29
End: 2024-04-22
Payer: MEDICAID

## 2024-04-22 ENCOUNTER — TELEPHONE (OUTPATIENT)
Dept: FAMILY MEDICINE CLINIC | Facility: CLINIC | Age: 29
End: 2024-04-22

## 2024-04-22 ENCOUNTER — LAB (OUTPATIENT)
Dept: FAMILY MEDICINE CLINIC | Facility: CLINIC | Age: 29
End: 2024-04-22
Payer: MEDICAID

## 2024-04-22 VITALS
BODY MASS INDEX: 29.74 KG/M2 | HEART RATE: 99 BPM | OXYGEN SATURATION: 99 % | DIASTOLIC BLOOD PRESSURE: 96 MMHG | HEIGHT: 64 IN | SYSTOLIC BLOOD PRESSURE: 144 MMHG | WEIGHT: 174.2 LBS | TEMPERATURE: 98.9 F

## 2024-04-22 DIAGNOSIS — F90.0 ATTENTION DEFICIT HYPERACTIVITY DISORDER (ADHD), PREDOMINANTLY INATTENTIVE TYPE: ICD-10-CM

## 2024-04-22 DIAGNOSIS — F41.9 ANXIETY: ICD-10-CM

## 2024-04-22 DIAGNOSIS — Z00.00 ENCOUNTER FOR GENERAL ADULT MEDICAL EXAMINATION WITHOUT ABNORMAL FINDINGS: Primary | ICD-10-CM

## 2024-04-22 LAB
ALBUMIN SERPL-MCNC: 4.5 G/DL (ref 3.5–5.2)
ALBUMIN/GLOB SERPL: 2 G/DL
ALP SERPL-CCNC: 68 U/L (ref 39–117)
ALT SERPL W P-5'-P-CCNC: 23 U/L (ref 1–33)
ANION GAP SERPL CALCULATED.3IONS-SCNC: 12.5 MMOL/L (ref 5–15)
AST SERPL-CCNC: 21 U/L (ref 1–32)
BASOPHILS # BLD AUTO: 0.04 10*3/MM3 (ref 0–0.2)
BASOPHILS NFR BLD AUTO: 0.6 % (ref 0–1.5)
BILIRUB SERPL-MCNC: 0.5 MG/DL (ref 0–1.2)
BUN SERPL-MCNC: 11 MG/DL (ref 6–20)
BUN/CREAT SERPL: 15.7 (ref 7–25)
CALCIUM SPEC-SCNC: 9.4 MG/DL (ref 8.6–10.5)
CHLORIDE SERPL-SCNC: 101 MMOL/L (ref 98–107)
CHOLEST SERPL-MCNC: 136 MG/DL (ref 0–200)
CO2 SERPL-SCNC: 26.5 MMOL/L (ref 22–29)
CREAT SERPL-MCNC: 0.7 MG/DL (ref 0.57–1)
DEPRECATED RDW RBC AUTO: 40.5 FL (ref 37–54)
EGFRCR SERPLBLD CKD-EPI 2021: 121 ML/MIN/1.73
EOSINOPHIL # BLD AUTO: 0.07 10*3/MM3 (ref 0–0.4)
EOSINOPHIL NFR BLD AUTO: 1.1 % (ref 0.3–6.2)
ERYTHROCYTE [DISTWIDTH] IN BLOOD BY AUTOMATED COUNT: 12.5 % (ref 12.3–15.4)
GLOBULIN UR ELPH-MCNC: 2.3 GM/DL
GLUCOSE SERPL-MCNC: 88 MG/DL (ref 65–99)
HCT VFR BLD AUTO: 42.6 % (ref 34–46.6)
HDLC SERPL-MCNC: 55 MG/DL (ref 40–60)
HGB BLD-MCNC: 13.9 G/DL (ref 12–15.9)
IMM GRANULOCYTES # BLD AUTO: 0.02 10*3/MM3 (ref 0–0.05)
IMM GRANULOCYTES NFR BLD AUTO: 0.3 % (ref 0–0.5)
LDLC SERPL CALC-MCNC: 47 MG/DL (ref 0–100)
LDLC/HDLC SERPL: 0.69 {RATIO}
LYMPHOCYTES # BLD AUTO: 1.66 10*3/MM3 (ref 0.7–3.1)
LYMPHOCYTES NFR BLD AUTO: 26 % (ref 19.6–45.3)
MCH RBC QN AUTO: 29 PG (ref 26.6–33)
MCHC RBC AUTO-ENTMCNC: 32.6 G/DL (ref 31.5–35.7)
MCV RBC AUTO: 88.8 FL (ref 79–97)
MONOCYTES # BLD AUTO: 0.43 10*3/MM3 (ref 0.1–0.9)
MONOCYTES NFR BLD AUTO: 6.7 % (ref 5–12)
NEUTROPHILS NFR BLD AUTO: 4.16 10*3/MM3 (ref 1.7–7)
NEUTROPHILS NFR BLD AUTO: 65.3 % (ref 42.7–76)
NRBC BLD AUTO-RTO: 0 /100 WBC (ref 0–0.2)
PLATELET # BLD AUTO: 307 10*3/MM3 (ref 140–450)
PMV BLD AUTO: 10.5 FL (ref 6–12)
POTASSIUM SERPL-SCNC: 4.1 MMOL/L (ref 3.5–5.2)
PROT SERPL-MCNC: 6.8 G/DL (ref 6–8.5)
RBC # BLD AUTO: 4.8 10*6/MM3 (ref 3.77–5.28)
SODIUM SERPL-SCNC: 140 MMOL/L (ref 136–145)
TRIGL SERPL-MCNC: 215 MG/DL (ref 0–150)
TSH SERPL DL<=0.05 MIU/L-ACNC: 2.74 UIU/ML (ref 0.27–4.2)
VLDLC SERPL-MCNC: 34 MG/DL (ref 5–40)
WBC NRBC COR # BLD AUTO: 6.38 10*3/MM3 (ref 3.4–10.8)

## 2024-04-22 PROCEDURE — 80061 LIPID PANEL: CPT | Performed by: FAMILY MEDICINE

## 2024-04-22 PROCEDURE — 80050 GENERAL HEALTH PANEL: CPT | Performed by: FAMILY MEDICINE

## 2024-04-22 PROCEDURE — 99395 PREV VISIT EST AGE 18-39: CPT | Performed by: FAMILY MEDICINE

## 2024-04-22 PROCEDURE — 2014F MENTAL STATUS ASSESS: CPT | Performed by: FAMILY MEDICINE

## 2024-04-22 PROCEDURE — 36415 COLL VENOUS BLD VENIPUNCTURE: CPT | Performed by: FAMILY MEDICINE

## 2024-04-22 RX ORDER — DEXTROAMPHETAMINE SACCHARATE, AMPHETAMINE ASPARTATE MONOHYDRATE, DEXTROAMPHETAMINE SULFATE AND AMPHETAMINE SULFATE 5; 5; 5; 5 MG/1; MG/1; MG/1; MG/1
40 CAPSULE, EXTENDED RELEASE ORAL EVERY MORNING
Qty: 60 CAPSULE | Refills: 0 | Status: SHIPPED | OUTPATIENT
Start: 2024-04-22 | End: 2024-05-21 | Stop reason: SDUPTHER

## 2024-04-22 RX ORDER — DEXTROAMPHETAMINE SACCHARATE, AMPHETAMINE ASPARTATE MONOHYDRATE, DEXTROAMPHETAMINE SULFATE AND AMPHETAMINE SULFATE 5; 5; 5; 5 MG/1; MG/1; MG/1; MG/1
40 CAPSULE, EXTENDED RELEASE ORAL EVERY MORNING
Qty: 60 CAPSULE | Refills: 0 | Status: SHIPPED | OUTPATIENT
Start: 2024-04-22 | End: 2024-04-22 | Stop reason: SDUPTHER

## 2024-04-22 RX ORDER — AMOXICILLIN 500 MG/1
500 CAPSULE ORAL 2 TIMES DAILY
Qty: 20 CAPSULE | Refills: 0 | Status: SHIPPED | OUTPATIENT
Start: 2024-04-22 | End: 2024-04-22 | Stop reason: SDUPTHER

## 2024-04-22 RX ORDER — AMOXICILLIN 500 MG/1
500 CAPSULE ORAL 2 TIMES DAILY
Qty: 20 CAPSULE | Refills: 0 | Status: SHIPPED | OUTPATIENT
Start: 2024-04-22

## 2024-04-22 RX ORDER — CLONAZEPAM 0.5 MG/1
1 TABLET ORAL DAILY PRN
Qty: 60 TABLET | Refills: 1 | Status: SHIPPED | OUTPATIENT
Start: 2024-04-22

## 2024-04-22 RX ORDER — BUPROPION HYDROCHLORIDE 300 MG/1
300 TABLET ORAL EVERY MORNING
Qty: 90 TABLET | Refills: 0 | Status: SHIPPED | OUTPATIENT
Start: 2024-04-22

## 2024-04-22 NOTE — PROGRESS NOTES
"Subjective   Arlette Mix is a 28 y.o. female and is here for a comprehensive physical exam. The patient reports problems - She has been having trouble getting her medicines filled since there have been backorders on some of her medicines. .    Do you take any herbs or supplements that were not prescribed by a doctor? no  Are you taking calcium supplements? no  Are you taking aspirin daily? no    The following portions of the patient's history were reviewed and updated as appropriate: allergies, current medications, past family history, past medical history, past social history, past surgical history, and problem list.    Review of Systems  Do you have pain that bothers you in your daily life? no  Pertinent items are noted in HPI.    Objective   /96 (BP Location: Right arm, Patient Position: Sitting, Cuff Size: Large Adult)   Pulse 99   Temp 98.9 °F (37.2 °C) (Infrared)   Ht 162.6 cm (64\")   Wt 79 kg (174 lb 3.2 oz)   SpO2 99%   BMI 29.90 kg/m²   General appearance: alert, appears stated age, and cooperative  Head: Normocephalic, without obvious abnormality, atraumatic  Eyes: conjunctivae/corneas clear. PERRL, EOM's intact. Fundi benign.  Ears: normal TM's and external ear canals both ears  Throat: abnormal findings: moderate oropharyngeal erythema  Neck: mild anterior cervical adenopathy, no carotid bruit, supple, symmetrical, trachea midline, and thyroid not enlarged, symmetric, no tenderness/mass/nodules  Lungs: clear to auscultation bilaterally  Heart: regular rate and rhythm, S1, S2 normal, no murmur, click, rub or gallop  Abdomen: soft, non-tender; bowel sounds normal; no masses,  no organomegaly  Extremities: extremities normal, atraumatic, no cyanosis or edema  Pulses: 2+ and symmetric  Skin: Skin color, texture, turgor normal. No rashes or lesions  Lymph nodes: Cervical adenopathy: mild right  Neurologic: Grossly normal     No visits with results within 1 Week(s) from this visit.   Latest " known visit with results is:   Admission on 12/26/2023, Discharged on 12/26/2023   Component Date Value Ref Range Status    COVID19 12/26/2023 Not Detected  Not Detected - Ref. Range Final    Influenza A PCR 12/26/2023 Not Detected  Not Detected Final    Influenza B PCR 12/26/2023 Not Detected  Not Detected Final    HCG, Urine QL 12/26/2023 Negative  Negative Final    RSV, PCR 12/26/2023 Detected (A)  Not Detected Final       Assessment & Plan   Healthy female exam.   Diagnoses and all orders for this visit:    1. Encounter for general adult medical examination without abnormal findings (Primary)  -     Comprehensive Metabolic Panel  -     Lipid Panel  -     CBC & Differential  -     TSH    2. Attention deficit hyperactivity disorder (ADHD), predominantly inattentive type  -     amphetamine-dextroamphetamine XR (ADDERALL XR) 20 MG 24 hr capsule; Take 2 capsules by mouth Every Morning  Dispense: 60 capsule; Refill: 0    3. Anxiety  -     clonazePAM (KlonoPIN) 0.5 MG tablet; Take 2 tablets by mouth Daily As Needed for Anxiety.  Dispense: 60 tablet; Refill: 1    Other orders  -     buPROPion XL (Wellbutrin XL) 300 MG 24 hr tablet; Take 1 tablet by mouth Every Morning.  Dispense: 90 tablet; Refill: 0  -     amoxicillin (AMOXIL) 500 MG capsule; Take 1 capsule by mouth 2 (Two) Times a Day.  Dispense: 20 capsule; Refill: 0      1. Well exam.   2. Patient Counseling:  --Nutrition: Stressed importance of moderation in sodium/caffeine intake, saturated fat and cholesterol, caloric balance, sufficient intake of fresh fruits, vegetables, fiber, calcium, iron, and 1 mg of folate supplement per day (for females capable of pregnancy)..  --Exercise: Stressed the importance of regular exercise.   --Dental health: Discussed importance of regular tooth brushing, flossing, and dental visits.  --Immunizations reviewed.      3. Discussed the patient's BMI with her.  The BMI is above average; BMI management plan is completed  4. Follow up  as needed for acute illness

## 2024-04-22 NOTE — TELEPHONE ENCOUNTER
Caller: Arlette Mix    Relationship: Self    Best call back number:     Arlette Mix (Self) [phone] (Mobile)     What was the call regarding: WANTED TO KNOW IF THE ADDERALL AND AMOXICILLIN COULD BE REFAXED TO THE PHARMACY PLEASE     Is it okay if the provider responds through MyChart: CALL AND ADVISE

## 2024-04-22 NOTE — TELEPHONE ENCOUNTER
I did call the pharmacy - and they confirmed they have not received the Adderall or the amoxicillin

## 2024-04-23 ENCOUNTER — PATIENT ROUNDING (BHMG ONLY) (OUTPATIENT)
Dept: FAMILY MEDICINE CLINIC | Facility: CLINIC | Age: 29
End: 2024-04-23
Payer: MEDICAID

## 2024-05-21 DIAGNOSIS — F41.9 ANXIETY: ICD-10-CM

## 2024-05-21 DIAGNOSIS — F90.0 ATTENTION DEFICIT HYPERACTIVITY DISORDER (ADHD), PREDOMINANTLY INATTENTIVE TYPE: ICD-10-CM

## 2024-05-21 RX ORDER — DEXTROAMPHETAMINE SACCHARATE, AMPHETAMINE ASPARTATE MONOHYDRATE, DEXTROAMPHETAMINE SULFATE AND AMPHETAMINE SULFATE 5; 5; 5; 5 MG/1; MG/1; MG/1; MG/1
40 CAPSULE, EXTENDED RELEASE ORAL EVERY MORNING
Qty: 60 CAPSULE | Refills: 0 | Status: SHIPPED | OUTPATIENT
Start: 2024-05-21 | End: 2024-05-22 | Stop reason: SDUPTHER

## 2024-05-21 RX ORDER — CLONAZEPAM 0.5 MG/1
1 TABLET ORAL DAILY PRN
Qty: 60 TABLET | Refills: 1 | Status: SHIPPED | OUTPATIENT
Start: 2024-05-21 | End: 2024-05-22 | Stop reason: SDUPTHER

## 2024-05-22 DIAGNOSIS — F41.9 ANXIETY: ICD-10-CM

## 2024-05-22 DIAGNOSIS — F90.0 ATTENTION DEFICIT HYPERACTIVITY DISORDER (ADHD), PREDOMINANTLY INATTENTIVE TYPE: ICD-10-CM

## 2024-05-22 RX ORDER — CLONAZEPAM 0.5 MG/1
1 TABLET ORAL DAILY PRN
Qty: 60 TABLET | Refills: 1 | Status: SHIPPED | OUTPATIENT
Start: 2024-05-22

## 2024-05-22 RX ORDER — DEXTROAMPHETAMINE SACCHARATE, AMPHETAMINE ASPARTATE MONOHYDRATE, DEXTROAMPHETAMINE SULFATE AND AMPHETAMINE SULFATE 5; 5; 5; 5 MG/1; MG/1; MG/1; MG/1
40 CAPSULE, EXTENDED RELEASE ORAL EVERY MORNING
Qty: 60 CAPSULE | Refills: 0 | Status: SHIPPED | OUTPATIENT
Start: 2024-05-22

## 2024-06-18 DIAGNOSIS — F41.9 ANXIETY: ICD-10-CM

## 2024-06-18 DIAGNOSIS — F90.0 ATTENTION DEFICIT HYPERACTIVITY DISORDER (ADHD), PREDOMINANTLY INATTENTIVE TYPE: ICD-10-CM

## 2024-06-18 RX ORDER — DEXTROAMPHETAMINE SACCHARATE, AMPHETAMINE ASPARTATE MONOHYDRATE, DEXTROAMPHETAMINE SULFATE AND AMPHETAMINE SULFATE 5; 5; 5; 5 MG/1; MG/1; MG/1; MG/1
40 CAPSULE, EXTENDED RELEASE ORAL EVERY MORNING
Qty: 60 CAPSULE | Refills: 0 | Status: SHIPPED | OUTPATIENT
Start: 2024-06-18

## 2024-06-18 RX ORDER — CLONAZEPAM 0.5 MG/1
1 TABLET ORAL DAILY PRN
Qty: 60 TABLET | Refills: 1 | Status: SHIPPED | OUTPATIENT
Start: 2024-06-18

## 2024-07-18 DIAGNOSIS — F41.9 ANXIETY: ICD-10-CM

## 2024-07-18 DIAGNOSIS — F90.0 ATTENTION DEFICIT HYPERACTIVITY DISORDER (ADHD), PREDOMINANTLY INATTENTIVE TYPE: ICD-10-CM

## 2024-07-22 RX ORDER — CLONAZEPAM 0.5 MG/1
1 TABLET ORAL DAILY PRN
Qty: 60 TABLET | Refills: 1 | Status: SHIPPED | OUTPATIENT
Start: 2024-07-22

## 2024-07-22 RX ORDER — DEXTROAMPHETAMINE SACCHARATE, AMPHETAMINE ASPARTATE MONOHYDRATE, DEXTROAMPHETAMINE SULFATE AND AMPHETAMINE SULFATE 5; 5; 5; 5 MG/1; MG/1; MG/1; MG/1
40 CAPSULE, EXTENDED RELEASE ORAL EVERY MORNING
Qty: 60 CAPSULE | Refills: 0 | Status: SHIPPED | OUTPATIENT
Start: 2024-07-22

## 2024-08-20 DIAGNOSIS — F90.0 ATTENTION DEFICIT HYPERACTIVITY DISORDER (ADHD), PREDOMINANTLY INATTENTIVE TYPE: ICD-10-CM

## 2024-08-20 DIAGNOSIS — F41.9 ANXIETY: ICD-10-CM

## 2024-08-20 RX ORDER — DEXTROAMPHETAMINE SACCHARATE, AMPHETAMINE ASPARTATE MONOHYDRATE, DEXTROAMPHETAMINE SULFATE AND AMPHETAMINE SULFATE 5; 5; 5; 5 MG/1; MG/1; MG/1; MG/1
40 CAPSULE, EXTENDED RELEASE ORAL EVERY MORNING
Qty: 60 CAPSULE | Refills: 0 | Status: SHIPPED | OUTPATIENT
Start: 2024-08-20

## 2024-08-20 RX ORDER — CLONAZEPAM 0.5 MG/1
1 TABLET ORAL DAILY PRN
Qty: 60 TABLET | Refills: 1 | Status: SHIPPED | OUTPATIENT
Start: 2024-08-20

## 2024-08-20 RX ORDER — BUPROPION HYDROCHLORIDE 300 MG/1
300 TABLET ORAL EVERY MORNING
Qty: 90 TABLET | Refills: 0 | Status: SHIPPED | OUTPATIENT
Start: 2024-08-20

## 2024-08-22 ENCOUNTER — TELEPHONE (OUTPATIENT)
Dept: FAMILY MEDICINE CLINIC | Facility: CLINIC | Age: 29
End: 2024-08-22

## 2024-08-22 ENCOUNTER — TELEPHONE (OUTPATIENT)
Dept: FAMILY MEDICINE CLINIC | Facility: CLINIC | Age: 29
End: 2024-08-22
Payer: MEDICAID

## 2024-08-22 NOTE — TELEPHONE ENCOUNTER
Caller: Arlette Mix    Relationship: Self    Best call back number: [phone]    What medication are you requesting: WELLBUTRIN XL HIGHER DOSE    What are your current symptoms: CAN TELL SHE IS OK, BUT IS HAVING SOME DEPRESSION STILL AND CAN TELL. ESPECIALLY WHEN SHE IS GETTING UP IN THE MORNING , WOULD LIKE TO FEEL LIKE SHE HAS MORE SELF POSITIVES    How long have you been experiencing symptoms:     Have you had these symptoms before:    [x] Yes  [] No    Have you been treated for these symptoms before:   [x] Yes  [] No    If a prescription is needed, what is your preferred pharmacy and phone number: [id number] - [place], [place] - [address] - [phone]  - [phone]      Additional notes:

## 2024-08-23 RX ORDER — VALACYCLOVIR HYDROCHLORIDE 500 MG/1
500 TABLET, FILM COATED ORAL 2 TIMES DAILY
Qty: 14 TABLET | Refills: 1 | Status: SHIPPED | OUTPATIENT
Start: 2024-08-23

## 2024-08-23 NOTE — TELEPHONE ENCOUNTER
It looks like she is currently on Wellbutrin Xl 300 mg daily.  It is possible to go as high as 450 mg per day but the medicine does not come in a 450 mg size pill.  She would have to have 2 prescriptions - one for the 300 mg and one for a 150 mg.  She would take one of each of the pills daily.  Does she want to do this?

## 2024-08-26 ENCOUNTER — PATIENT MESSAGE (OUTPATIENT)
Dept: FAMILY MEDICINE CLINIC | Facility: CLINIC | Age: 29
End: 2024-08-26
Payer: MEDICAID

## 2024-08-27 RX ORDER — BUPROPION HYDROCHLORIDE 150 MG/1
150 TABLET ORAL DAILY
Qty: 90 TABLET | Refills: 1 | Status: SHIPPED | OUTPATIENT
Start: 2024-08-27

## 2024-09-18 DIAGNOSIS — F90.0 ATTENTION DEFICIT HYPERACTIVITY DISORDER (ADHD), PREDOMINANTLY INATTENTIVE TYPE: ICD-10-CM

## 2024-09-18 DIAGNOSIS — F41.9 ANXIETY: ICD-10-CM

## 2024-09-18 RX ORDER — DEXTROAMPHETAMINE SACCHARATE, AMPHETAMINE ASPARTATE MONOHYDRATE, DEXTROAMPHETAMINE SULFATE AND AMPHETAMINE SULFATE 5; 5; 5; 5 MG/1; MG/1; MG/1; MG/1
40 CAPSULE, EXTENDED RELEASE ORAL EVERY MORNING
Qty: 60 CAPSULE | Refills: 0 | Status: SHIPPED | OUTPATIENT
Start: 2024-09-18

## 2024-09-18 RX ORDER — CLONAZEPAM 0.5 MG/1
1 TABLET ORAL DAILY PRN
Qty: 60 TABLET | Refills: 1 | Status: SHIPPED | OUTPATIENT
Start: 2024-09-18

## 2024-09-18 RX ORDER — BUPROPION HYDROCHLORIDE 300 MG/1
300 TABLET ORAL EVERY MORNING
Qty: 90 TABLET | Refills: 0 | Status: SHIPPED | OUTPATIENT
Start: 2024-09-18

## 2024-09-30 ENCOUNTER — TELEPHONE (OUTPATIENT)
Dept: FAMILY MEDICINE CLINIC | Facility: CLINIC | Age: 29
End: 2024-09-30

## 2024-09-30 NOTE — TELEPHONE ENCOUNTER
Caller: Arlette Mix    Relationship to patient: Self    Best call back number:     Chief complaint: PATIENT WANTS TO HAVE HER IUD REMOVED    Type of visit: IN OFFICE PROCEDURE    Requested date:      If rescheduling, when is the original appointment:      Additional notes:PATIENT IS CALLING IN TO ASK TO BE SCHEDULED TO HAVE HER IUD REMOVED.  SHE WASNTS TO BE CALLED TO HAVE THIS SCHEDULED

## 2024-10-08 RX ORDER — VALACYCLOVIR HYDROCHLORIDE 500 MG/1
500 TABLET, FILM COATED ORAL DAILY
Qty: 30 TABLET | Refills: 2 | Status: SHIPPED | OUTPATIENT
Start: 2024-10-08

## 2024-10-08 NOTE — TELEPHONE ENCOUNTER
Hello, through the telehealth ana maria I was prescribed Valtrex/Acyclovir a few times in such a short amount of time that they finally said it would be best to take it daily to just prevent problems from occurring so much. The Doctor prescribed me a 1 month dose and said I would need to get it from my PCP after that. You did call me in something but It wasn’t a recurring monthly prescription. It was a 7 day prescription. How can I get on a daily dose because I get cold sore/fever blisters extremely bad and that keeps it at bay.

## 2024-10-21 DIAGNOSIS — F90.0 ATTENTION DEFICIT HYPERACTIVITY DISORDER (ADHD), PREDOMINANTLY INATTENTIVE TYPE: ICD-10-CM

## 2024-10-21 DIAGNOSIS — F41.9 ANXIETY: ICD-10-CM

## 2024-10-21 RX ORDER — DEXTROAMPHETAMINE SACCHARATE, AMPHETAMINE ASPARTATE MONOHYDRATE, DEXTROAMPHETAMINE SULFATE AND AMPHETAMINE SULFATE 5; 5; 5; 5 MG/1; MG/1; MG/1; MG/1
40 CAPSULE, EXTENDED RELEASE ORAL EVERY MORNING
Qty: 60 CAPSULE | Refills: 0 | Status: SHIPPED | OUTPATIENT
Start: 2024-10-21

## 2024-10-21 RX ORDER — CLONAZEPAM 0.5 MG/1
1 TABLET ORAL DAILY PRN
Qty: 60 TABLET | Refills: 1 | Status: SHIPPED | OUTPATIENT
Start: 2024-10-21

## 2024-11-19 DIAGNOSIS — F90.0 ATTENTION DEFICIT HYPERACTIVITY DISORDER (ADHD), PREDOMINANTLY INATTENTIVE TYPE: ICD-10-CM

## 2024-11-19 DIAGNOSIS — F41.9 ANXIETY: ICD-10-CM

## 2024-11-20 ENCOUNTER — TELEPHONE (OUTPATIENT)
Dept: FAMILY MEDICINE CLINIC | Facility: CLINIC | Age: 29
End: 2024-11-20

## 2024-11-20 RX ORDER — DEXTROAMPHETAMINE SACCHARATE, AMPHETAMINE ASPARTATE MONOHYDRATE, DEXTROAMPHETAMINE SULFATE AND AMPHETAMINE SULFATE 5; 5; 5; 5 MG/1; MG/1; MG/1; MG/1
40 CAPSULE, EXTENDED RELEASE ORAL EVERY MORNING
Qty: 60 CAPSULE | Refills: 0 | Status: SHIPPED | OUTPATIENT
Start: 2024-11-20

## 2024-11-20 RX ORDER — VALACYCLOVIR HYDROCHLORIDE 500 MG/1
500 TABLET, FILM COATED ORAL DAILY
Qty: 30 TABLET | Refills: 2 | Status: SHIPPED | OUTPATIENT
Start: 2024-11-20

## 2024-11-20 RX ORDER — CLONAZEPAM 0.5 MG/1
1 TABLET ORAL DAILY PRN
Qty: 60 TABLET | Refills: 1 | Status: SHIPPED | OUTPATIENT
Start: 2024-11-20

## 2024-11-20 NOTE — TELEPHONE ENCOUNTER
Caller: Arlette Mix    Relationship to patient: Self    Best call back number:   Telephone Information:   Mobile [phone]         Patient is needing: PATIENT CALLED AND WOULD LIKE TO DIS[facility]. SHE DOES NOT FEEL LIKE THE INCREASE IN WELLBUTRIN IS WORKING FOR HER. PLEASE CALL PATIENT BACK TO DISCUSS ANXIETY MEDICATION OPTIONS.

## 2024-12-17 DIAGNOSIS — F41.9 ANXIETY: ICD-10-CM

## 2024-12-17 DIAGNOSIS — F90.0 ATTENTION DEFICIT HYPERACTIVITY DISORDER (ADHD), PREDOMINANTLY INATTENTIVE TYPE: ICD-10-CM

## 2024-12-17 RX ORDER — DEXTROAMPHETAMINE SACCHARATE, AMPHETAMINE ASPARTATE MONOHYDRATE, DEXTROAMPHETAMINE SULFATE AND AMPHETAMINE SULFATE 5; 5; 5; 5 MG/1; MG/1; MG/1; MG/1
40 CAPSULE, EXTENDED RELEASE ORAL EVERY MORNING
Qty: 60 CAPSULE | Refills: 0 | Status: SHIPPED | OUTPATIENT
Start: 2024-12-17

## 2024-12-17 RX ORDER — CLONAZEPAM 0.5 MG/1
1 TABLET ORAL DAILY PRN
Qty: 60 TABLET | Refills: 1 | Status: SHIPPED | OUTPATIENT
Start: 2024-12-17

## 2025-01-17 DIAGNOSIS — F41.9 ANXIETY: ICD-10-CM

## 2025-01-17 DIAGNOSIS — F90.0 ATTENTION DEFICIT HYPERACTIVITY DISORDER (ADHD), PREDOMINANTLY INATTENTIVE TYPE: ICD-10-CM

## 2025-01-17 RX ORDER — DEXTROAMPHETAMINE SACCHARATE, AMPHETAMINE ASPARTATE MONOHYDRATE, DEXTROAMPHETAMINE SULFATE AND AMPHETAMINE SULFATE 5; 5; 5; 5 MG/1; MG/1; MG/1; MG/1
40 CAPSULE, EXTENDED RELEASE ORAL EVERY MORNING
Qty: 60 CAPSULE | Refills: 0 | Status: SHIPPED | OUTPATIENT
Start: 2025-01-17

## 2025-01-17 RX ORDER — VALACYCLOVIR HYDROCHLORIDE 500 MG/1
500 TABLET, FILM COATED ORAL DAILY
Qty: 30 TABLET | Refills: 2 | Status: SHIPPED | OUTPATIENT
Start: 2025-01-17

## 2025-01-17 RX ORDER — CLONAZEPAM 0.5 MG/1
1 TABLET ORAL DAILY PRN
Qty: 60 TABLET | Refills: 1 | Status: SHIPPED | OUTPATIENT
Start: 2025-01-17

## 2025-02-19 DIAGNOSIS — F90.0 ATTENTION DEFICIT HYPERACTIVITY DISORDER (ADHD), PREDOMINANTLY INATTENTIVE TYPE: ICD-10-CM

## 2025-02-19 DIAGNOSIS — F41.9 ANXIETY: ICD-10-CM

## 2025-02-19 RX ORDER — DEXTROAMPHETAMINE SACCHARATE, AMPHETAMINE ASPARTATE MONOHYDRATE, DEXTROAMPHETAMINE SULFATE AND AMPHETAMINE SULFATE 5; 5; 5; 5 MG/1; MG/1; MG/1; MG/1
40 CAPSULE, EXTENDED RELEASE ORAL EVERY MORNING
Qty: 60 CAPSULE | Refills: 0 | Status: SHIPPED | OUTPATIENT
Start: 2025-02-19

## 2025-02-19 RX ORDER — CLONAZEPAM 0.5 MG/1
1 TABLET ORAL DAILY PRN
Qty: 60 TABLET | Refills: 0 | Status: SHIPPED | OUTPATIENT
Start: 2025-02-19

## 2025-03-18 DIAGNOSIS — F90.0 ATTENTION DEFICIT HYPERACTIVITY DISORDER (ADHD), PREDOMINANTLY INATTENTIVE TYPE: ICD-10-CM

## 2025-03-18 DIAGNOSIS — F41.9 ANXIETY: ICD-10-CM

## 2025-03-18 NOTE — TELEPHONE ENCOUNTER
Rx Refill Note  Requested Prescriptions     Pending Prescriptions Disp Refills    clonazePAM (KlonoPIN) 0.5 MG tablet 60 tablet 0     Sig: Take 2 tablets by mouth Daily As Needed for Anxiety.    amphetamine-dextroamphetamine XR (ADDERALL XR) 20 MG 24 hr capsule 60 capsule 0     Sig: Take 2 capsules by mouth Every Morning      Last office visit with prescribing clinician: 4/22/2024   Last telemedicine visit with prescribing clinician: Visit date not found   Next office visit with prescribing clinician: Visit date not found                         Would you like a call back once the refill request has been completed: [] Yes [] No    If the office needs to give you a call back, can they leave a voicemail: [] Yes [] No    Aliyah Godinez MA  03/18/25, 13:54 EDT

## 2025-03-19 RX ORDER — CLONAZEPAM 0.5 MG/1
1 TABLET ORAL DAILY PRN
Qty: 60 TABLET | Refills: 0 | Status: SHIPPED | OUTPATIENT
Start: 2025-03-19

## 2025-03-19 RX ORDER — DEXTROAMPHETAMINE SACCHARATE, AMPHETAMINE ASPARTATE MONOHYDRATE, DEXTROAMPHETAMINE SULFATE AND AMPHETAMINE SULFATE 5; 5; 5; 5 MG/1; MG/1; MG/1; MG/1
40 CAPSULE, EXTENDED RELEASE ORAL EVERY MORNING
Qty: 60 CAPSULE | Refills: 0 | Status: SHIPPED | OUTPATIENT
Start: 2025-03-19

## 2025-04-21 DIAGNOSIS — F41.9 ANXIETY: ICD-10-CM

## 2025-04-21 DIAGNOSIS — F90.0 ATTENTION DEFICIT HYPERACTIVITY DISORDER (ADHD), PREDOMINANTLY INATTENTIVE TYPE: ICD-10-CM

## 2025-04-22 RX ORDER — CLONAZEPAM 0.5 MG/1
1 TABLET ORAL DAILY PRN
Qty: 60 TABLET | Refills: 0 | Status: SHIPPED | OUTPATIENT
Start: 2025-04-22

## 2025-04-22 RX ORDER — DEXTROAMPHETAMINE SACCHARATE, AMPHETAMINE ASPARTATE MONOHYDRATE, DEXTROAMPHETAMINE SULFATE AND AMPHETAMINE SULFATE 5; 5; 5; 5 MG/1; MG/1; MG/1; MG/1
40 CAPSULE, EXTENDED RELEASE ORAL EVERY MORNING
Qty: 60 CAPSULE | Refills: 0 | Status: SHIPPED | OUTPATIENT
Start: 2025-04-22

## 2025-05-23 DIAGNOSIS — F41.9 ANXIETY: ICD-10-CM

## 2025-05-23 DIAGNOSIS — F90.0 ATTENTION DEFICIT HYPERACTIVITY DISORDER (ADHD), PREDOMINANTLY INATTENTIVE TYPE: ICD-10-CM

## 2025-05-23 RX ORDER — CLONAZEPAM 0.5 MG/1
1 TABLET ORAL DAILY PRN
Qty: 60 TABLET | Refills: 0 | Status: SHIPPED | OUTPATIENT
Start: 2025-05-23

## 2025-05-23 RX ORDER — DEXTROAMPHETAMINE SACCHARATE, AMPHETAMINE ASPARTATE MONOHYDRATE, DEXTROAMPHETAMINE SULFATE AND AMPHETAMINE SULFATE 5; 5; 5; 5 MG/1; MG/1; MG/1; MG/1
40 CAPSULE, EXTENDED RELEASE ORAL EVERY MORNING
Qty: 60 CAPSULE | Refills: 0 | Status: SHIPPED | OUTPATIENT
Start: 2025-05-23

## 2025-05-23 NOTE — TELEPHONE ENCOUNTER
LVM, advised patient that the Rx was refilled. I also left message that she needed to be seen before any more refills will be done.

## 2025-05-23 NOTE — TELEPHONE ENCOUNTER
I refilled her medicines but it has been over a year since she has been here.  She needs to make an appointment to see me before any further refills.

## 2025-05-29 NOTE — TELEPHONE ENCOUNTER
Caller: RAFAELA    Relationship to patient: Network      Best call back number: [phone]     Provider: PATEL CASTELLON     Medication PA needed: valACYclovir (Valtrex) 500 MG tablet     Reason for call/Prior Auth:

## 2025-05-30 ENCOUNTER — OFFICE VISIT (OUTPATIENT)
Dept: FAMILY MEDICINE CLINIC | Facility: CLINIC | Age: 30
End: 2025-05-30
Payer: MEDICAID

## 2025-05-30 VITALS
SYSTOLIC BLOOD PRESSURE: 145 MMHG | BODY MASS INDEX: 31.76 KG/M2 | HEART RATE: 138 BPM | WEIGHT: 186 LBS | HEIGHT: 64 IN | DIASTOLIC BLOOD PRESSURE: 107 MMHG | TEMPERATURE: 98.8 F | OXYGEN SATURATION: 97 %

## 2025-05-30 DIAGNOSIS — Z00.00 WELLNESS EXAMINATION: Primary | ICD-10-CM

## 2025-05-30 DIAGNOSIS — L40.3 PSORIASIS PLANTARIS: ICD-10-CM

## 2025-05-30 DIAGNOSIS — B00.1 FEVER BLISTER: ICD-10-CM

## 2025-05-30 DIAGNOSIS — F32.1 CURRENT MODERATE EPISODE OF MAJOR DEPRESSIVE DISORDER, UNSPECIFIED WHETHER RECURRENT: ICD-10-CM

## 2025-05-30 DIAGNOSIS — F90.0 ATTENTION DEFICIT HYPERACTIVITY DISORDER (ADHD), PREDOMINANTLY INATTENTIVE TYPE: ICD-10-CM

## 2025-05-30 DIAGNOSIS — K63.5 POLYP OF COLON, UNSPECIFIED PART OF COLON, UNSPECIFIED TYPE: ICD-10-CM

## 2025-05-30 PROBLEM — B35.4 TINEA CORPORIS: Status: RESOLVED | Noted: 2022-03-16 | Resolved: 2025-05-30

## 2025-05-30 PROBLEM — Z20.2 STD EXPOSURE: Status: RESOLVED | Noted: 2021-12-01 | Resolved: 2025-05-30

## 2025-05-30 PROBLEM — N89.8 VAGINAL DISCHARGE: Status: RESOLVED | Noted: 2021-12-01 | Resolved: 2025-05-30

## 2025-05-30 RX ORDER — ESCITALOPRAM OXALATE 5 MG/1
5 TABLET ORAL DAILY
Qty: 90 TABLET | Refills: 1 | Status: SHIPPED | OUTPATIENT
Start: 2025-05-30

## 2025-05-30 RX ORDER — NORELGESTROMIN AND ETHINYL ESTRADIOL 35; 150 UG/MG; UG/MG
1 PATCH TRANSDERMAL WEEKLY
Qty: 9 PATCH | Refills: 5 | Status: SHIPPED | OUTPATIENT
Start: 2025-05-30

## 2025-05-30 RX ORDER — VALACYCLOVIR HYDROCHLORIDE 500 MG/1
500 TABLET, FILM COATED ORAL DAILY
Qty: 90 TABLET | Refills: 3 | Status: SHIPPED | OUTPATIENT
Start: 2025-05-30

## 2025-05-30 NOTE — ASSESSMENT & PLAN NOTE
Patient's depression is a recurrent episode that is moderate without psychosis. Depression is active and worsening.    Plan:   Begin new antidepressant medicine; Lexapro    Followup at the next regular appointment.

## 2025-05-30 NOTE — TELEPHONE ENCOUNTER
Rx Refill Note  Requested Prescriptions     Pending Prescriptions Disp Refills    valACYclovir (Valtrex) 500 MG tablet 30 tablet 2     Sig: Take 1 tablet by mouth Daily.      Last office visit with prescribing clinician: 4/22/2024   Last telemedicine visit with prescribing clinician: Visit date not found   Next office visit with prescribing clinician: 5/30/2025                         Would you like a call back once the refill request has been completed: [] Yes [] No    If the office needs to give you a call back, can they leave a voicemail: [] Yes [] No    Aliyah Godinez MA  05/30/25, 08:32 EDT

## 2025-05-30 NOTE — PROGRESS NOTES
"Subjective   Arlette Mix is a 29 y.o. female and is here for a comprehensive physical exam. The patient reports problems - psoriasis on both feet, worse recently with dry, flaky, itchy skin despite triamcinolone cream.    Do you take any herbs or supplements that were not prescribed by a doctor? no  Are you taking calcium supplements? no  Are you taking aspirin daily? no    The following portions of the patient's history were reviewed and updated as appropriate: allergies, current medications, past family history, past medical history, past social history, past surgical history, and problem list.    Review of Systems  Do you have pain that bothers you in your daily life? not asked  Pertinent items are noted in HPI.    Objective   BP (!) 145/107 (BP Location: Left arm, Patient Position: Sitting, Cuff Size: Adult)   Pulse (!) 138   Temp 98.8 °F (37.1 °C) (Temporal)   Ht 162.6 cm (64\")   Wt 84.4 kg (186 lb)   SpO2 97%   BMI 31.93 kg/m²   General appearance: alert, appears stated age, and cooperative  Head: Normocephalic, without obvious abnormality, atraumatic  Eyes: conjunctivae/corneas clear. PERRL, EOM's intact. Fundi benign.  Neck: no adenopathy, no JVD, supple, symmetrical, trachea midline, and thyroid not enlarged, symmetric, no tenderness/mass/nodules  Lungs: clear to auscultation bilaterally  Heart: regular rate and rhythm, S1, S2 normal, no murmur, click, rub or gallop  Extremities: extremities normal, atraumatic, no cyanosis or edema  Skin: lichenification - feet bilateral  Lymph nodes: Cervical, supraclavicular, and axillary nodes normal.  Neurologic: Grossly normal     No visits with results within 1 Week(s) from this visit.   Latest known visit with results is:   Office Visit on 04/22/2024   Component Date Value Ref Range Status    Glucose 04/22/2024 88  65 - 99 mg/dL Final    BUN 04/22/2024 11  6 - 20 mg/dL Final    Creatinine 04/22/2024 0.70  0.57 - 1.00 mg/dL Final    Sodium 04/22/2024 140  " 136 - 145 mmol/L Final    Potassium 04/22/2024 4.1  3.5 - 5.2 mmol/L Final    Chloride 04/22/2024 101  98 - 107 mmol/L Final    CO2 04/22/2024 26.5  22.0 - 29.0 mmol/L Final    Calcium 04/22/2024 9.4  8.6 - 10.5 mg/dL Final    Total Protein 04/22/2024 6.8  6.0 - 8.5 g/dL Final    Albumin 04/22/2024 4.5  3.5 - 5.2 g/dL Final    ALT (SGPT) 04/22/2024 23  1 - 33 U/L Final    AST (SGOT) 04/22/2024 21  1 - 32 U/L Final    Alkaline Phosphatase 04/22/2024 68  39 - 117 U/L Final    Total Bilirubin 04/22/2024 0.5  0.0 - 1.2 mg/dL Final    Globulin 04/22/2024 2.3  gm/dL Final    A/G Ratio 04/22/2024 2.0  g/dL Final    BUN/Creatinine Ratio 04/22/2024 15.7  7.0 - 25.0 Final    Anion Gap 04/22/2024 12.5  5.0 - 15.0 mmol/L Final    eGFR 04/22/2024 121.0  >60.0 mL/min/1.73 Final    Total Cholesterol 04/22/2024 136  0 - 200 mg/dL Final    Triglycerides 04/22/2024 215 (H)  0 - 150 mg/dL Final    HDL Cholesterol 04/22/2024 55  40 - 60 mg/dL Final    LDL Cholesterol  04/22/2024 47  0 - 100 mg/dL Final    VLDL Cholesterol 04/22/2024 34  5 - 40 mg/dL Final    LDL/HDL Ratio 04/22/2024 0.69   Final    TSH 04/22/2024 2.740  0.270 - 4.200 uIU/mL Final    WBC 04/22/2024 6.38  3.40 - 10.80 10*3/mm3 Final    RBC 04/22/2024 4.80  3.77 - 5.28 10*6/mm3 Final    Hemoglobin 04/22/2024 13.9  12.0 - 15.9 g/dL Final    Hematocrit 04/22/2024 42.6  34.0 - 46.6 % Final    MCV 04/22/2024 88.8  79.0 - 97.0 fL Final    MCH 04/22/2024 29.0  26.6 - 33.0 pg Final    MCHC 04/22/2024 32.6  31.5 - 35.7 g/dL Final    RDW 04/22/2024 12.5  12.3 - 15.4 % Final    RDW-SD 04/22/2024 40.5  37.0 - 54.0 fl Final    MPV 04/22/2024 10.5  6.0 - 12.0 fL Final    Platelets 04/22/2024 307  140 - 450 10*3/mm3 Final    Neutrophil % 04/22/2024 65.3  42.7 - 76.0 % Final    Lymphocyte % 04/22/2024 26.0  19.6 - 45.3 % Final    Monocyte % 04/22/2024 6.7  5.0 - 12.0 % Final    Eosinophil % 04/22/2024 1.1  0.3 - 6.2 % Final    Basophil % 04/22/2024 0.6  0.0 - 1.5 % Final    Immature  Grans % 04/22/2024 0.3  0.0 - 0.5 % Final    Neutrophils, Absolute 04/22/2024 4.16  1.70 - 7.00 10*3/mm3 Final    Lymphocytes, Absolute 04/22/2024 1.66  0.70 - 3.10 10*3/mm3 Final    Monocytes, Absolute 04/22/2024 0.43  0.10 - 0.90 10*3/mm3 Final    Eosinophils, Absolute 04/22/2024 0.07  0.00 - 0.40 10*3/mm3 Final    Basophils, Absolute 04/22/2024 0.04  0.00 - 0.20 10*3/mm3 Final    Immature Grans, Absolute 04/22/2024 0.02  0.00 - 0.05 10*3/mm3 Final    nRBC 04/22/2024 0.0  0.0 - 0.2 /100 WBC Final       Assessment & Plan   Healthy female exam.    Diagnoses and all orders for this visit:    1. Wellness examination (Primary)  -     norelgestromin-ethinyl estradiol (ORTHO EVRA) 150-35 MCG/24HR; Place 1 patch on the skin as directed by provider 1 (One) Time Per Week.  Dispense: 9 patch; Refill: 5    2. Psoriasis plantaris  -     Ambulatory Referral to Dermatology    3. Fever blister    4. Polyp of colon, unspecified part of colon, unspecified type  -     Ambulatory Referral For Screening Colonoscopy    5. Current moderate episode of major depressive disorder, unspecified whether recurrent  Assessment & Plan:  Patient's depression is a recurrent episode that is moderate without psychosis. Depression is active and worsening.    Plan:   Begin new antidepressant medicine; Lexapro    Followup at the next regular appointment.       6. Attention deficit hyperactivity disorder (ADHD), predominantly inattentive type  Assessment & Plan:  She would like to taper off of Adderall.       Other orders  -     escitalopram (Lexapro) 5 MG tablet; Take 1 tablet by mouth Daily.  Dispense: 90 tablet; Refill: 1      1. Well exam.   2. Patient Counseling:  --Nutrition: Stressed importance of moderation in sodium/caffeine intake, saturated fat and cholesterol, caloric balance, sufficient intake of fresh fruits, vegetables, fiber, calcium, iron, and 1 mg of folate supplement per day (for females capable of pregnancy).  --Exercise: Stressed the  importance of regular exercise.   --Dental health: Discussed importance of regular tooth brushing, flossing, and dental visits.  --Immunizations reviewed.  --Discussed benefits of screening colonoscopy.    3. Discussed the patient's BMI with her.  The BMI is above average; BMI management plan is completed  4. Follow up in one year           Answers submitted by the patient for this visit:  Problem not listed (Submitted on 5/30/2025)  Chief Complaint: Other medical problem  Reason for appointment: Psoriasis and current medication questions, allergies  abdominal pain: No  anorexia: No  joint pain: No  change in stool: No  chest pain: No  chills: No  nasal congestion: Yes  cough: Yes  diaphoresis: No  fatigue: No  fever: No  headaches: No  joint swelling: No  myalgias: No  nausea: No  neck pain: No  numbness: No  rash: No  sore throat: No  swollen glands: No  dysuria: No  vertigo: No  visual change: No  vomiting: No  weakness: No  Onset: in the past 7 days  Chronicity: recurrent  Frequency: constantly

## 2025-06-23 DIAGNOSIS — F41.9 ANXIETY: ICD-10-CM

## 2025-06-23 DIAGNOSIS — F90.0 ATTENTION DEFICIT HYPERACTIVITY DISORDER (ADHD), PREDOMINANTLY INATTENTIVE TYPE: ICD-10-CM

## 2025-06-23 RX ORDER — DEXTROAMPHETAMINE SACCHARATE, AMPHETAMINE ASPARTATE MONOHYDRATE, DEXTROAMPHETAMINE SULFATE AND AMPHETAMINE SULFATE 5; 5; 5; 5 MG/1; MG/1; MG/1; MG/1
40 CAPSULE, EXTENDED RELEASE ORAL EVERY MORNING
Qty: 60 CAPSULE | Refills: 0 | Status: SHIPPED | OUTPATIENT
Start: 2025-06-23

## 2025-06-23 RX ORDER — CLONAZEPAM 0.5 MG/1
1 TABLET ORAL DAILY PRN
Qty: 60 TABLET | Refills: 0 | Status: SHIPPED | OUTPATIENT
Start: 2025-06-23

## 2025-06-30 ENCOUNTER — TELEPHONE (OUTPATIENT)
Dept: FAMILY MEDICINE CLINIC | Facility: CLINIC | Age: 30
End: 2025-06-30
Payer: MEDICAID

## 2025-06-30 RX ORDER — ONDANSETRON 4 MG/1
4 TABLET, ORALLY DISINTEGRATING ORAL EVERY 8 HOURS PRN
Qty: 20 TABLET | Refills: 1 | Status: SHIPPED | OUTPATIENT
Start: 2025-06-30

## 2025-06-30 RX ORDER — ESCITALOPRAM OXALATE 10 MG/1
10 TABLET ORAL DAILY
Qty: 90 TABLET | Refills: 1 | Status: SHIPPED | OUTPATIENT
Start: 2025-06-30

## 2025-06-30 NOTE — TELEPHONE ENCOUNTER
PATIENT CALLED WANTED TO KNOW IF YOU CAN INCREASE HER DOES OF LEXAPRO. SHE IS TAKING 5 MG ONCE A DAY BEEN ON FOR A MONTH.    SHE ALSO IS HAVING SOME NAUSEA WANTS TO KNOW IF YOU CAN CALL IN [place] FOR HER.  SHE DOESN'T KNOW IF THE NAUSEA IS FROM HER STARTING A NEW BIRTH CONTROL.

## 2025-06-30 NOTE — TELEPHONE ENCOUNTER
Caller: Arlette Mix    Relationship: Self    Best call back number: [phone]     What was the call regarding: PATIENT IS CALLING TO LET DR CASTELLON KNOW THAT SHE CANNOT FIND ANY DERMATOLOGIST OFFICES WHO TAKE HER INSURANCE. SHE HAS CONTACTED HER INSURANCE AND THEY HAVE GIVEN HER A LIST OF OFFICE BUT NONE OF THEM TAKE HER INSURANCE. SHE STATES SHE DID FIND AN OFFICE BUT IT IS AN HOUR AND A HALF AWAY. PATIENT WOULD LIKE TO KNOW WHAT SHE SHOULD DO     PLEASE CALL AND ADVISE

## 2025-07-21 DIAGNOSIS — F41.9 ANXIETY: ICD-10-CM

## 2025-07-21 DIAGNOSIS — F90.0 ATTENTION DEFICIT HYPERACTIVITY DISORDER (ADHD), PREDOMINANTLY INATTENTIVE TYPE: ICD-10-CM

## 2025-07-21 RX ORDER — CLONAZEPAM 0.5 MG/1
1 TABLET ORAL DAILY PRN
Qty: 60 TABLET | Refills: 0 | Status: SHIPPED | OUTPATIENT
Start: 2025-07-21

## 2025-07-21 RX ORDER — ONDANSETRON 4 MG/1
4 TABLET, ORALLY DISINTEGRATING ORAL EVERY 8 HOURS PRN
Qty: 20 TABLET | Refills: 1 | Status: SHIPPED | OUTPATIENT
Start: 2025-07-21

## 2025-07-21 RX ORDER — DEXTROAMPHETAMINE SACCHARATE, AMPHETAMINE ASPARTATE MONOHYDRATE, DEXTROAMPHETAMINE SULFATE AND AMPHETAMINE SULFATE 5; 5; 5; 5 MG/1; MG/1; MG/1; MG/1
40 CAPSULE, EXTENDED RELEASE ORAL EVERY MORNING
Qty: 60 CAPSULE | Refills: 0 | Status: SHIPPED | OUTPATIENT
Start: 2025-07-21

## 2025-08-14 ENCOUNTER — ON CAMPUS - OUTPATIENT (AMBULATORY)
Dept: URBAN - METROPOLITAN AREA HOSPITAL 2 | Facility: HOSPITAL | Age: 30
End: 2025-08-14
Payer: MEDICAID

## 2025-08-14 ENCOUNTER — OFFICE (AMBULATORY)
Dept: URBAN - METROPOLITAN AREA PATHOLOGY 19 | Facility: PATHOLOGY | Age: 30
End: 2025-08-14
Payer: MEDICAID

## 2025-08-14 VITALS
SYSTOLIC BLOOD PRESSURE: 119 MMHG | SYSTOLIC BLOOD PRESSURE: 107 MMHG | HEART RATE: 77 BPM | OXYGEN SATURATION: 98 % | DIASTOLIC BLOOD PRESSURE: 63 MMHG | HEART RATE: 72 BPM | OXYGEN SATURATION: 99 % | HEART RATE: 81 BPM | SYSTOLIC BLOOD PRESSURE: 100 MMHG | HEART RATE: 100 BPM | SYSTOLIC BLOOD PRESSURE: 109 MMHG | DIASTOLIC BLOOD PRESSURE: 72 MMHG | SYSTOLIC BLOOD PRESSURE: 111 MMHG | DIASTOLIC BLOOD PRESSURE: 79 MMHG | RESPIRATION RATE: 18 BRPM | DIASTOLIC BLOOD PRESSURE: 92 MMHG | SYSTOLIC BLOOD PRESSURE: 108 MMHG | DIASTOLIC BLOOD PRESSURE: 98 MMHG | HEART RATE: 85 BPM | SYSTOLIC BLOOD PRESSURE: 121 MMHG | OXYGEN SATURATION: 100 % | HEIGHT: 66 IN | DIASTOLIC BLOOD PRESSURE: 82 MMHG | HEART RATE: 78 BPM | HEART RATE: 70 BPM | WEIGHT: 166 LBS | DIASTOLIC BLOOD PRESSURE: 91 MMHG | TEMPERATURE: 96.8 F | RESPIRATION RATE: 15 BRPM | SYSTOLIC BLOOD PRESSURE: 137 MMHG | RESPIRATION RATE: 16 BRPM

## 2025-08-14 DIAGNOSIS — Z09 ENCOUNTER FOR FOLLOW-UP EXAMINATION AFTER COMPLETED TREATMEN: ICD-10-CM

## 2025-08-14 DIAGNOSIS — Z86.0101 PERSONAL HISTORY OF ADENOMATOUS AND SERRATED COLON POLYPS: ICD-10-CM

## 2025-08-14 DIAGNOSIS — K62.89 OTHER SPECIFIED DISEASES OF ANUS AND RECTUM: ICD-10-CM

## 2025-08-14 DIAGNOSIS — K63.89 OTHER SPECIFIED DISEASES OF INTESTINE: ICD-10-CM

## 2025-08-14 PROBLEM — Z86.010 SURVEILLANCE DUE TO PRIOR COLONIC NEOPLASIA: Status: ACTIVE | Noted: 2025-08-14

## 2025-08-14 LAB
GI HISTOLOGY: PDF REPORT: (no result)
Lab: (no result)

## 2025-08-14 PROCEDURE — 45380 COLONOSCOPY AND BIOPSY: CPT | Mod: 33 | Performed by: INTERNAL MEDICINE

## 2025-08-14 PROCEDURE — 88305 TISSUE EXAM BY PATHOLOGIST: CPT | Mod: 26 | Performed by: PATHOLOGY

## 2025-08-14 RX ADMIN — ONDANSETRON HYDROCHLORIDE 4 MG: 4 SOLUTION ORAL at 11:59

## 2025-08-22 DIAGNOSIS — F90.0 ATTENTION DEFICIT HYPERACTIVITY DISORDER (ADHD), PREDOMINANTLY INATTENTIVE TYPE: ICD-10-CM

## 2025-08-22 DIAGNOSIS — F41.9 ANXIETY: ICD-10-CM

## 2025-08-22 RX ORDER — DEXTROAMPHETAMINE SACCHARATE, AMPHETAMINE ASPARTATE MONOHYDRATE, DEXTROAMPHETAMINE SULFATE AND AMPHETAMINE SULFATE 5; 5; 5; 5 MG/1; MG/1; MG/1; MG/1
40 CAPSULE, EXTENDED RELEASE ORAL EVERY MORNING
Qty: 60 CAPSULE | Refills: 0 | Status: SHIPPED | OUTPATIENT
Start: 2025-08-22

## 2025-08-22 RX ORDER — CLONAZEPAM 0.5 MG/1
1 TABLET ORAL DAILY PRN
Qty: 60 TABLET | Refills: 0 | Status: SHIPPED | OUTPATIENT
Start: 2025-08-22